# Patient Record
Sex: MALE | Race: BLACK OR AFRICAN AMERICAN | URBAN - METROPOLITAN AREA
[De-identification: names, ages, dates, MRNs, and addresses within clinical notes are randomized per-mention and may not be internally consistent; named-entity substitution may affect disease eponyms.]

---

## 2017-01-10 ENCOUNTER — HISTORICAL (OUTPATIENT)
Dept: HEMATOLOGY/ONCOLOGY | Facility: CLINIC | Age: 77
End: 2017-01-10

## 2017-01-11 ENCOUNTER — HISTORICAL (OUTPATIENT)
Dept: FAMILY MEDICINE | Facility: CLINIC | Age: 77
End: 2017-01-11

## 2017-01-11 ENCOUNTER — HISTORICAL (OUTPATIENT)
Dept: RADIOLOGY | Facility: HOSPITAL | Age: 77
End: 2017-01-11

## 2017-01-23 ENCOUNTER — HISTORICAL (OUTPATIENT)
Dept: INFUSION THERAPY | Facility: HOSPITAL | Age: 77
End: 2017-01-23

## 2017-01-27 ENCOUNTER — HISTORICAL (OUTPATIENT)
Dept: INFUSION THERAPY | Facility: HOSPITAL | Age: 77
End: 2017-01-27

## 2017-01-30 ENCOUNTER — HISTORICAL (OUTPATIENT)
Dept: INTERNAL MEDICINE | Facility: CLINIC | Age: 77
End: 2017-01-30

## 2017-02-20 ENCOUNTER — HISTORICAL (OUTPATIENT)
Dept: INFUSION THERAPY | Facility: HOSPITAL | Age: 77
End: 2017-02-20

## 2017-02-24 ENCOUNTER — HISTORICAL (OUTPATIENT)
Dept: INFUSION THERAPY | Facility: HOSPITAL | Age: 77
End: 2017-02-24

## 2017-02-27 ENCOUNTER — HISTORICAL (OUTPATIENT)
Dept: HEMATOLOGY/ONCOLOGY | Facility: CLINIC | Age: 77
End: 2017-02-27

## 2017-04-03 ENCOUNTER — HISTORICAL (OUTPATIENT)
Dept: ADMINISTRATIVE | Facility: HOSPITAL | Age: 77
End: 2017-04-03

## 2017-04-10 ENCOUNTER — HISTORICAL (OUTPATIENT)
Dept: SURGERY | Facility: HOSPITAL | Age: 77
End: 2017-04-10

## 2017-04-13 ENCOUNTER — HISTORICAL (OUTPATIENT)
Dept: FAMILY MEDICINE | Facility: CLINIC | Age: 77
End: 2017-04-13

## 2017-04-28 ENCOUNTER — HISTORICAL (OUTPATIENT)
Dept: ADMINISTRATIVE | Facility: HOSPITAL | Age: 77
End: 2017-04-28

## 2017-04-28 LAB — PSA SERPL-MCNC: 1.2 NG/ML (ref 0–4)

## 2017-05-02 ENCOUNTER — HISTORICAL (OUTPATIENT)
Dept: ADMINISTRATIVE | Facility: HOSPITAL | Age: 77
End: 2017-05-02

## 2017-05-02 LAB
ABS NEUT (OLG): 2.41 X10(3)/MCL (ref 2.1–9.2)
ALBUMIN SERPL-MCNC: 3.6 GM/DL (ref 3.4–5)
ALBUMIN/GLOB SERPL: 1 RATIO (ref 1–2)
ALP SERPL-CCNC: 66 UNIT/L (ref 20–120)
ALT SERPL-CCNC: 12 UNIT/L
AST SERPL-CCNC: 18 UNIT/L
BASOPHILS # BLD AUTO: 0.01 X10(3)/MCL
BASOPHILS NFR BLD AUTO: 0 % (ref 0–1)
BILIRUB SERPL-MCNC: 0.6 MG/DL
BILIRUBIN DIRECT+TOT PNL SERPL-MCNC: <0.1 MG/DL
BILIRUBIN DIRECT+TOT PNL SERPL-MCNC: >0.5 MG/DL
BUN SERPL-MCNC: 19 MG/DL (ref 7–25)
CALCIUM SERPL-MCNC: 9.1 MG/DL (ref 8.4–10.3)
CHLORIDE SERPL-SCNC: 109 MMOL/L (ref 96–110)
CO2 SERPL-SCNC: 24 MMOL/L (ref 24–32)
CREAT SERPL-MCNC: 1 MG/DL (ref 0.7–1.4)
EOSINOPHIL # BLD AUTO: 0.23 10*3/UL
EOSINOPHIL NFR BLD AUTO: 6 % (ref 0–5)
ERYTHROCYTE [DISTWIDTH] IN BLOOD BY AUTOMATED COUNT: 14.5 % (ref 11.5–14.5)
GLOBULIN SER-MCNC: 2.8 GM/ML (ref 2.3–3.5)
GLUCOSE SERPL-MCNC: 95 MG/DL (ref 65–99)
HCT VFR BLD AUTO: 31.6 % (ref 40–51)
HGB BLD-MCNC: 10.2 GM/DL (ref 13.5–17.5)
IMM GRANULOCYTES # BLD AUTO: 0.01 10*3/UL
IMM GRANULOCYTES NFR BLD AUTO: 0 %
LYMPHOCYTES # BLD AUTO: 0.77 X10(3)/MCL
LYMPHOCYTES NFR BLD AUTO: 20 % (ref 15–40)
MCH RBC QN AUTO: 28.5 PG (ref 26–34)
MCHC RBC AUTO-ENTMCNC: 32.3 GM/DL (ref 31–37)
MCV RBC AUTO: 88.3 FL (ref 80–100)
MONOCYTES # BLD AUTO: 0.5 X10(3)/MCL
MONOCYTES NFR BLD AUTO: 13 % (ref 4–12)
NEUTROPHILS # BLD AUTO: 2.41 X10(3)/MCL
NEUTROPHILS NFR BLD AUTO: 61 X10(3)/MCL
PLATELET # BLD AUTO: 162 X10(3)/MCL (ref 130–400)
PMV BLD AUTO: 9.3 FL (ref 7.4–10.4)
POTASSIUM SERPL-SCNC: 4.3 MMOL/L (ref 3.6–5.2)
PROT SERPL-MCNC: 6.4 GM/DL (ref 6–8)
RBC # BLD AUTO: 3.58 X10(6)/MCL (ref 4.5–5.9)
SODIUM SERPL-SCNC: 142 MMOL/L (ref 135–146)
WBC # SPEC AUTO: 3.9 X10(3)/MCL (ref 4.5–11)

## 2017-05-05 ENCOUNTER — HISTORICAL (OUTPATIENT)
Dept: SURGERY | Facility: HOSPITAL | Age: 77
End: 2017-05-05

## 2017-08-03 ENCOUNTER — HISTORICAL (OUTPATIENT)
Dept: ADMINISTRATIVE | Facility: HOSPITAL | Age: 77
End: 2017-08-03

## 2017-08-16 ENCOUNTER — HISTORICAL (OUTPATIENT)
Dept: ADMINISTRATIVE | Facility: HOSPITAL | Age: 77
End: 2017-08-16

## 2017-08-16 LAB
ABS NEUT (OLG): 4.92 X10(3)/MCL (ref 2.1–9.2)
ALBUMIN SERPL-MCNC: 3.4 GM/DL (ref 3.4–5)
ALBUMIN/GLOB SERPL: 1 RATIO (ref 1–2)
ALP SERPL-CCNC: 76 UNIT/L (ref 45–117)
ALT SERPL-CCNC: 19 UNIT/L (ref 12–78)
AST SERPL-CCNC: 14 UNIT/L (ref 15–37)
BASOPHILS # BLD AUTO: 0.02 X10(3)/MCL
BASOPHILS NFR BLD AUTO: 0 % (ref 0–1)
BILIRUB SERPL-MCNC: 0.4 MG/DL (ref 0.2–1)
BILIRUBIN DIRECT+TOT PNL SERPL-MCNC: <0.1 MG/DL
BILIRUBIN DIRECT+TOT PNL SERPL-MCNC: >0.3 MG/DL
BUN SERPL-MCNC: 20 MG/DL (ref 7–18)
CALCIUM SERPL-MCNC: 8.9 MG/DL (ref 8.5–10.1)
CHLORIDE SERPL-SCNC: 110 MMOL/L (ref 98–107)
CO2 SERPL-SCNC: 25 MMOL/L (ref 21–32)
CREAT SERPL-MCNC: 1.2 MG/DL (ref 0.6–1.3)
EOSINOPHIL # BLD AUTO: 0.12 10*3/UL
EOSINOPHIL NFR BLD AUTO: 2 % (ref 0–5)
ERYTHROCYTE [DISTWIDTH] IN BLOOD BY AUTOMATED COUNT: 13.2 % (ref 11.5–14.5)
GLOBULIN SER-MCNC: 3.5 GM/ML (ref 2.3–3.5)
GLUCOSE SERPL-MCNC: 121 MG/DL (ref 74–106)
HCT VFR BLD AUTO: 35.6 % (ref 40–51)
HGB BLD-MCNC: 11.4 GM/DL (ref 13.5–17.5)
IMM GRANULOCYTES # BLD AUTO: 0.02 10*3/UL
IMM GRANULOCYTES NFR BLD AUTO: 0 %
LYMPHOCYTES # BLD AUTO: 0.94 X10(3)/MCL
LYMPHOCYTES NFR BLD AUTO: 15 % (ref 15–40)
MCH RBC QN AUTO: 27.7 PG (ref 26–34)
MCHC RBC AUTO-ENTMCNC: 32 GM/DL (ref 31–37)
MCV RBC AUTO: 86.4 FL (ref 80–100)
MONOCYTES # BLD AUTO: 0.43 X10(3)/MCL
MONOCYTES NFR BLD AUTO: 7 % (ref 4–12)
NEUTROPHILS # BLD AUTO: 4.92 X10(3)/MCL
NEUTROPHILS NFR BLD AUTO: 76 X10(3)/MCL
PLATELET # BLD AUTO: 181 X10(3)/MCL (ref 130–400)
PMV BLD AUTO: 9.6 FL (ref 7.4–10.4)
POTASSIUM SERPL-SCNC: 4.5 MMOL/L (ref 3.5–5.1)
PROT SERPL-MCNC: 6.9 GM/DL (ref 6.4–8.2)
RBC # BLD AUTO: 4.12 X10(6)/MCL (ref 4.5–5.9)
SODIUM SERPL-SCNC: 142 MMOL/L (ref 136–145)
WBC # SPEC AUTO: 6.4 X10(3)/MCL (ref 4.5–11)

## 2017-10-09 ENCOUNTER — HISTORICAL (OUTPATIENT)
Dept: RADIOLOGY | Facility: HOSPITAL | Age: 77
End: 2017-10-09

## 2017-10-12 ENCOUNTER — HISTORICAL (OUTPATIENT)
Dept: ADMINISTRATIVE | Facility: HOSPITAL | Age: 77
End: 2017-10-12

## 2017-11-16 ENCOUNTER — HISTORICAL (OUTPATIENT)
Dept: ADMINISTRATIVE | Facility: HOSPITAL | Age: 77
End: 2017-11-16

## 2017-11-16 LAB
ABS NEUT (OLG): 3.87 X10(3)/MCL (ref 2.1–9.2)
ALBUMIN SERPL-MCNC: 3.2 GM/DL (ref 3.4–5)
ALBUMIN/GLOB SERPL: 1 RATIO (ref 1–2)
ALP SERPL-CCNC: 74 UNIT/L (ref 45–117)
ALT SERPL-CCNC: 14 UNIT/L (ref 12–78)
AST SERPL-CCNC: 11 UNIT/L (ref 15–37)
BASOPHILS # BLD AUTO: 0.01 X10(3)/MCL
BASOPHILS NFR BLD AUTO: 0 % (ref 0–1)
BILIRUB SERPL-MCNC: 0.4 MG/DL (ref 0.2–1)
BILIRUBIN DIRECT+TOT PNL SERPL-MCNC: 0.1 MG/DL
BILIRUBIN DIRECT+TOT PNL SERPL-MCNC: 0.3 MG/DL
BUN SERPL-MCNC: 12 MG/DL (ref 7–18)
CALCIUM SERPL-MCNC: 9 MG/DL (ref 8.5–10.1)
CHLORIDE SERPL-SCNC: 111 MMOL/L (ref 98–107)
CO2 SERPL-SCNC: 28 MMOL/L (ref 21–32)
CREAT SERPL-MCNC: 1 MG/DL (ref 0.6–1.3)
EOSINOPHIL # BLD AUTO: 0.29 10*3/UL
EOSINOPHIL NFR BLD AUTO: 5 % (ref 0–5)
ERYTHROCYTE [DISTWIDTH] IN BLOOD BY AUTOMATED COUNT: 13.3 % (ref 11.5–14.5)
GLOBULIN SER-MCNC: 3.5 GM/ML (ref 2.3–3.5)
GLUCOSE SERPL-MCNC: 90 MG/DL (ref 74–106)
HCT VFR BLD AUTO: 33.9 % (ref 40–51)
HGB BLD-MCNC: 10.8 GM/DL (ref 13.5–17.5)
IMM GRANULOCYTES # BLD AUTO: 0.02 10*3/UL
IMM GRANULOCYTES NFR BLD AUTO: 0 %
LYMPHOCYTES # BLD AUTO: 0.73 X10(3)/MCL
LYMPHOCYTES NFR BLD AUTO: 14 % (ref 15–40)
MCH RBC QN AUTO: 27.6 PG (ref 26–34)
MCHC RBC AUTO-ENTMCNC: 31.9 GM/DL (ref 31–37)
MCV RBC AUTO: 86.7 FL (ref 80–100)
MONOCYTES # BLD AUTO: 0.46 X10(3)/MCL
MONOCYTES NFR BLD AUTO: 9 % (ref 4–12)
NEUTROPHILS # BLD AUTO: 3.87 X10(3)/MCL
NEUTROPHILS NFR BLD AUTO: 72 X10(3)/MCL
PLATELET # BLD AUTO: 196 X10(3)/MCL (ref 130–400)
PMV BLD AUTO: 9.2 FL (ref 7.4–10.4)
POTASSIUM SERPL-SCNC: 3.6 MMOL/L (ref 3.5–5.1)
PROT SERPL-MCNC: 6.7 GM/DL (ref 6.4–8.2)
RBC # BLD AUTO: 3.91 X10(6)/MCL (ref 4.5–5.9)
SODIUM SERPL-SCNC: 144 MMOL/L (ref 136–145)
WBC # SPEC AUTO: 5.4 X10(3)/MCL (ref 4.5–11)

## 2017-12-27 ENCOUNTER — HISTORICAL (OUTPATIENT)
Dept: ADMINISTRATIVE | Facility: HOSPITAL | Age: 77
End: 2017-12-27

## 2017-12-27 LAB
BUN SERPL-MCNC: 18 MG/DL (ref 7–18)
CALCIUM SERPL-MCNC: 8.9 MG/DL (ref 8.5–10.1)
CHLORIDE SERPL-SCNC: 111 MMOL/L (ref 98–107)
CO2 SERPL-SCNC: 29 MMOL/L (ref 21–32)
CREAT SERPL-MCNC: 1.1 MG/DL (ref 0.6–1.3)
ERYTHROCYTE [DISTWIDTH] IN BLOOD BY AUTOMATED COUNT: 13.8 % (ref 11.5–14.5)
GLUCOSE SERPL-MCNC: 81 MG/DL (ref 74–106)
HCT VFR BLD AUTO: 37.3 % (ref 40–51)
HGB BLD-MCNC: 11.6 GM/DL (ref 13.5–17.5)
MCH RBC QN AUTO: 27.1 PG (ref 26–34)
MCHC RBC AUTO-ENTMCNC: 31.1 GM/DL (ref 31–37)
MCV RBC AUTO: 87.1 FL (ref 80–100)
PLATELET # BLD AUTO: 183 X10(3)/MCL (ref 130–400)
PMV BLD AUTO: 10.4 FL (ref 7.4–10.4)
POTASSIUM SERPL-SCNC: 4.3 MMOL/L (ref 3.5–5.1)
RBC # BLD AUTO: 4.28 X10(6)/MCL (ref 4.5–5.9)
SODIUM SERPL-SCNC: 145 MMOL/L (ref 136–145)
WBC # SPEC AUTO: 4.5 X10(3)/MCL (ref 4.5–11)

## 2017-12-28 ENCOUNTER — HISTORICAL (OUTPATIENT)
Dept: ADMINISTRATIVE | Facility: HOSPITAL | Age: 77
End: 2017-12-28

## 2017-12-28 LAB
ABS NEUT (OLG): 2.41 X10(3)/MCL (ref 2.1–9.2)
BASOPHILS # BLD AUTO: 0.02 X10(3)/MCL
BASOPHILS NFR BLD AUTO: 0 % (ref 0–1)
BUN SERPL-MCNC: 18 MG/DL (ref 7–18)
CALCIUM SERPL-MCNC: 8.8 MG/DL (ref 8.5–10.1)
CHLORIDE SERPL-SCNC: 110 MMOL/L (ref 98–107)
CO2 SERPL-SCNC: 29 MMOL/L (ref 21–32)
CREAT SERPL-MCNC: 1.1 MG/DL (ref 0.6–1.3)
EOSINOPHIL # BLD AUTO: 0.12 10*3/UL
EOSINOPHIL NFR BLD AUTO: 3 % (ref 0–5)
ERYTHROCYTE [DISTWIDTH] IN BLOOD BY AUTOMATED COUNT: 13.8 % (ref 11.5–14.5)
FOLATE SERPL-MCNC: 12.2 NG/ML (ref 3.1–17.5)
GLUCOSE SERPL-MCNC: 107 MG/DL (ref 74–106)
HCT VFR BLD AUTO: 37.5 % (ref 40–51)
HGB BLD-MCNC: 11.7 GM/DL (ref 13.5–17.5)
IMM GRANULOCYTES # BLD AUTO: 0.01 10*3/UL
IMM GRANULOCYTES NFR BLD AUTO: 0 %
LYMPHOCYTES # BLD AUTO: 0.93 X10(3)/MCL
LYMPHOCYTES NFR BLD AUTO: 24 % (ref 15–40)
MCH RBC QN AUTO: 27.3 PG (ref 26–34)
MCHC RBC AUTO-ENTMCNC: 31.2 GM/DL (ref 31–37)
MCV RBC AUTO: 87.4 FL (ref 80–100)
MONOCYTES # BLD AUTO: 0.33 X10(3)/MCL
MONOCYTES NFR BLD AUTO: 9 % (ref 4–12)
NEUTROPHILS # BLD AUTO: 2.41 X10(3)/MCL
NEUTROPHILS NFR BLD AUTO: 63 X10(3)/MCL
PLATELET # BLD AUTO: 184 X10(3)/MCL (ref 130–400)
PMV BLD AUTO: 10.7 FL (ref 7.4–10.4)
POTASSIUM SERPL-SCNC: 4.2 MMOL/L (ref 3.5–5.1)
RBC # BLD AUTO: 4.29 X10(6)/MCL (ref 4.5–5.9)
SODIUM SERPL-SCNC: 143 MMOL/L (ref 136–145)
T PALLIDUM AB SER QL: NONREACTIVE
TSH SERPL-ACNC: 2.25 MIU/L (ref 0.36–3.74)
VIT B12 SERPL-MCNC: 520 PG/ML (ref 193–986)
WBC # SPEC AUTO: 3.8 X10(3)/MCL (ref 4.5–11)

## 2018-01-03 ENCOUNTER — HISTORICAL (OUTPATIENT)
Dept: SURGERY | Facility: HOSPITAL | Age: 78
End: 2018-01-03

## 2018-01-15 ENCOUNTER — HISTORICAL (OUTPATIENT)
Dept: RADIOLOGY | Facility: HOSPITAL | Age: 78
End: 2018-01-15

## 2018-03-06 ENCOUNTER — HISTORICAL (OUTPATIENT)
Dept: ADMINISTRATIVE | Facility: HOSPITAL | Age: 78
End: 2018-03-06

## 2018-03-06 LAB
ABS NEUT (OLG): 2.11 X10(3)/MCL (ref 2.1–9.2)
ALBUMIN SERPL-MCNC: 3.8 GM/DL (ref 3.4–5)
ALBUMIN/GLOB SERPL: 1 RATIO (ref 1–2)
ALP SERPL-CCNC: 87 UNIT/L (ref 45–117)
ALT SERPL-CCNC: 14 UNIT/L (ref 12–78)
AST SERPL-CCNC: 17 UNIT/L (ref 15–37)
BASOPHILS # BLD AUTO: 0.02 X10(3)/MCL
BASOPHILS NFR BLD AUTO: 0 %
BILIRUB SERPL-MCNC: 0.6 MG/DL (ref 0.2–1)
BILIRUBIN DIRECT+TOT PNL SERPL-MCNC: 0.2 MG/DL
BILIRUBIN DIRECT+TOT PNL SERPL-MCNC: 0.4 MG/DL
BUN SERPL-MCNC: 17 MG/DL (ref 7–18)
CALCIUM SERPL-MCNC: 9.5 MG/DL (ref 8.5–10.1)
CHLORIDE SERPL-SCNC: 112 MMOL/L (ref 98–107)
CHOLEST SERPL-MCNC: 141 MG/DL
CHOLEST/HDLC SERPL: 2.8 {RATIO} (ref 0–5)
CO2 SERPL-SCNC: 28 MMOL/L (ref 21–32)
CREAT SERPL-MCNC: 1 MG/DL (ref 0.6–1.3)
EOSINOPHIL # BLD AUTO: 0.12 X10(3)/MCL
EOSINOPHIL NFR BLD AUTO: 3 %
ERYTHROCYTE [DISTWIDTH] IN BLOOD BY AUTOMATED COUNT: 14.5 % (ref 11.5–14.5)
FERRITIN SERPL-MCNC: 257.8 NG/ML (ref 26–388)
GLOBULIN SER-MCNC: 4.1 GM/ML (ref 2.3–3.5)
GLUCOSE SERPL-MCNC: 100 MG/DL (ref 74–106)
HCT VFR BLD AUTO: 40.3 % (ref 40–51)
HDLC SERPL-MCNC: 50 MG/DL
HGB BLD-MCNC: 12.7 GM/DL (ref 13.5–17.5)
IMM GRANULOCYTES # BLD AUTO: 0.01 10*3/UL
IMM GRANULOCYTES NFR BLD AUTO: 0 %
IRON SATN MFR SERPL: 44 % (ref 15–50)
IRON SERPL-MCNC: 77 MCG/DL (ref 65–175)
LDLC SERPL CALC-MCNC: 81 MG/DL (ref 0–130)
LYMPHOCYTES # BLD AUTO: 1.23 X10(3)/MCL
LYMPHOCYTES NFR BLD AUTO: 32 % (ref 13–40)
MCH RBC QN AUTO: 27.4 PG (ref 26–34)
MCHC RBC AUTO-ENTMCNC: 31.5 GM/DL (ref 31–37)
MCV RBC AUTO: 87 FL (ref 80–100)
MONOCYTES # BLD AUTO: 0.38 X10(3)/MCL
MONOCYTES NFR BLD AUTO: 10 % (ref 4–12)
NEUTROPHILS # BLD AUTO: 2.11 X10(3)/MCL
NEUTROPHILS NFR BLD AUTO: 54 X10(3)/MCL
PLATELET # BLD AUTO: 224 X10(3)/MCL (ref 130–400)
PMV BLD AUTO: 10.3 FL (ref 7.4–10.4)
POTASSIUM SERPL-SCNC: 5 MMOL/L (ref 3.5–5.1)
PROT SERPL-MCNC: 7.9 GM/DL (ref 6.4–8.2)
RBC # BLD AUTO: 4.63 X10(6)/MCL (ref 4.5–5.9)
SODIUM SERPL-SCNC: 147 MMOL/L (ref 136–145)
TIBC SERPL-MCNC: 175 MCG/DL (ref 250–450)
TRANSFERRIN SERPL-MCNC: 163 MG/DL (ref 200–360)
TRIGL SERPL-MCNC: 52 MG/DL
VLDLC SERPL CALC-MCNC: 10 MG/DL
WBC # SPEC AUTO: 3.9 X10(3)/MCL (ref 4.5–11)

## 2018-03-14 ENCOUNTER — HISTORICAL (OUTPATIENT)
Dept: RADIOLOGY | Facility: HOSPITAL | Age: 78
End: 2018-03-14

## 2018-03-14 LAB
ABS NEUT (OLG): 2.32 X10(3)/MCL (ref 2.1–9.2)
ALBUMIN SERPL-MCNC: 3.4 GM/DL (ref 3.4–5)
ALBUMIN/GLOB SERPL: 1 RATIO (ref 1–2)
ALP SERPL-CCNC: 82 UNIT/L (ref 45–117)
ALT SERPL-CCNC: 11 UNIT/L (ref 12–78)
AST SERPL-CCNC: 13 UNIT/L (ref 15–37)
BASOPHILS # BLD AUTO: 0.01 X10(3)/MCL
BASOPHILS NFR BLD AUTO: 0 %
BILIRUB SERPL-MCNC: 0.8 MG/DL (ref 0.2–1)
BILIRUBIN DIRECT+TOT PNL SERPL-MCNC: 0.2 MG/DL
BILIRUBIN DIRECT+TOT PNL SERPL-MCNC: 0.6 MG/DL
BUN SERPL-MCNC: 15 MG/DL (ref 7–18)
CALCIUM SERPL-MCNC: 8.9 MG/DL (ref 8.5–10.1)
CHLORIDE SERPL-SCNC: 114 MMOL/L (ref 98–107)
CO2 SERPL-SCNC: 28 MMOL/L (ref 21–32)
CREAT SERPL-MCNC: 1 MG/DL (ref 0.6–1.3)
EOSINOPHIL # BLD AUTO: 0.15 X10(3)/MCL
EOSINOPHIL NFR BLD AUTO: 4 %
ERYTHROCYTE [DISTWIDTH] IN BLOOD BY AUTOMATED COUNT: 14.5 % (ref 11.5–14.5)
GLOBULIN SER-MCNC: 3.7 GM/ML (ref 2.3–3.5)
GLUCOSE SERPL-MCNC: 101 MG/DL (ref 74–106)
HCT VFR BLD AUTO: 36.6 % (ref 40–51)
HGB BLD-MCNC: 11.5 GM/DL (ref 13.5–17.5)
LYMPHOCYTES # BLD AUTO: 0.9 X10(3)/MCL
LYMPHOCYTES NFR BLD AUTO: 24 % (ref 13–40)
MCH RBC QN AUTO: 27.2 PG (ref 26–34)
MCHC RBC AUTO-ENTMCNC: 31.4 GM/DL (ref 31–37)
MCV RBC AUTO: 86.5 FL (ref 80–100)
MONOCYTES # BLD AUTO: 0.43 X10(3)/MCL
MONOCYTES NFR BLD AUTO: 11 % (ref 4–12)
NEUTROPHILS # BLD AUTO: 2.32 X10(3)/MCL
NEUTROPHILS NFR BLD AUTO: 61 X10(3)/MCL
PLATELET # BLD AUTO: 182 X10(3)/MCL (ref 130–400)
PMV BLD AUTO: 10.2 FL (ref 7.4–10.4)
POTASSIUM SERPL-SCNC: 4 MMOL/L (ref 3.5–5.1)
PROT SERPL-MCNC: 7.1 GM/DL (ref 6.4–8.2)
RBC # BLD AUTO: 4.23 X10(6)/MCL (ref 4.5–5.9)
SODIUM SERPL-SCNC: 143 MMOL/L (ref 136–145)
WBC # SPEC AUTO: 3.8 X10(3)/MCL (ref 4.5–11)

## 2018-03-26 ENCOUNTER — HISTORICAL (OUTPATIENT)
Dept: CARDIOLOGY | Facility: HOSPITAL | Age: 78
End: 2018-03-26

## 2018-06-07 ENCOUNTER — HISTORICAL (OUTPATIENT)
Dept: ADMINISTRATIVE | Facility: HOSPITAL | Age: 78
End: 2018-06-07

## 2018-06-07 LAB
ABS NEUT (OLG): 2.67 X10(3)/MCL (ref 2.1–9.2)
BASOPHILS # BLD AUTO: 0.01 X10(3)/MCL
BASOPHILS NFR BLD AUTO: 0 %
BUN SERPL-MCNC: 17 MG/DL (ref 7–18)
CALCIUM SERPL-MCNC: 9.5 MG/DL (ref 8.5–10.1)
CHLORIDE SERPL-SCNC: 109 MMOL/L (ref 98–107)
CO2 SERPL-SCNC: 27 MMOL/L (ref 21–32)
CREAT SERPL-MCNC: 1.1 MG/DL (ref 0.6–1.3)
CREAT/UREA NIT SERPL: 15
EOSINOPHIL # BLD AUTO: 0.13 X10(3)/MCL
EOSINOPHIL NFR BLD AUTO: 3 %
ERYTHROCYTE [DISTWIDTH] IN BLOOD BY AUTOMATED COUNT: 13.2 % (ref 11.5–14.5)
GLUCOSE SERPL-MCNC: 100 MG/DL (ref 74–106)
HCT VFR BLD AUTO: 39.6 % (ref 40–51)
HGB BLD-MCNC: 12.5 GM/DL (ref 13.5–17.5)
IMM GRANULOCYTES # BLD AUTO: 0.01 10*3/UL
IMM GRANULOCYTES NFR BLD AUTO: 0 %
LYMPHOCYTES # BLD AUTO: 1.37 X10(3)/MCL
LYMPHOCYTES NFR BLD AUTO: 30 % (ref 13–40)
MCH RBC QN AUTO: 27.6 PG (ref 26–34)
MCHC RBC AUTO-ENTMCNC: 31.6 GM/DL (ref 31–37)
MCV RBC AUTO: 87.4 FL (ref 80–100)
MONOCYTES # BLD AUTO: 0.45 X10(3)/MCL
MONOCYTES NFR BLD AUTO: 10 % (ref 4–12)
NEUTROPHILS # BLD AUTO: 2.67 X10(3)/MCL
NEUTROPHILS NFR BLD AUTO: 58 X10(3)/MCL
PLATELET # BLD AUTO: 197 X10(3)/MCL (ref 130–400)
PMV BLD AUTO: 9.5 FL (ref 7.4–10.4)
POTASSIUM SERPL-SCNC: 4.6 MMOL/L (ref 3.5–5.1)
PSA SERPL-MCNC: 1.4 NG/ML
RBC # BLD AUTO: 4.53 X10(6)/MCL (ref 4.5–5.9)
SODIUM SERPL-SCNC: 144 MMOL/L (ref 136–145)
WBC # SPEC AUTO: 4.6 X10(3)/MCL (ref 4.5–11)

## 2018-06-21 ENCOUNTER — HISTORICAL (OUTPATIENT)
Dept: RADIOLOGY | Facility: HOSPITAL | Age: 78
End: 2018-06-21

## 2018-07-13 ENCOUNTER — HISTORICAL (OUTPATIENT)
Dept: ADMINISTRATIVE | Facility: HOSPITAL | Age: 78
End: 2018-07-13

## 2018-07-13 LAB
BUN SERPL-MCNC: 14 MG/DL (ref 7–18)
CALCIUM SERPL-MCNC: 9.1 MG/DL (ref 8.5–10.1)
CHLORIDE SERPL-SCNC: 111 MMOL/L (ref 98–107)
CO2 SERPL-SCNC: 30 MMOL/L (ref 21–32)
CREAT SERPL-MCNC: 1 MG/DL (ref 0.6–1.3)
CREAT/UREA NIT SERPL: 14
ERYTHROCYTE [DISTWIDTH] IN BLOOD BY AUTOMATED COUNT: 13.5 % (ref 11.5–14.5)
GLUCOSE SERPL-MCNC: 100 MG/DL (ref 74–106)
HCT VFR BLD AUTO: 37.7 % (ref 40–51)
HGB BLD-MCNC: 11.6 GM/DL (ref 13.5–17.5)
MCH RBC QN AUTO: 27.2 PG (ref 26–34)
MCHC RBC AUTO-ENTMCNC: 30.8 GM/DL (ref 31–37)
MCV RBC AUTO: 88.3 FL (ref 80–100)
PLATELET # BLD AUTO: 187 X10(3)/MCL (ref 130–400)
PMV BLD AUTO: 10.2 FL (ref 7.4–10.4)
POTASSIUM SERPL-SCNC: 4.4 MMOL/L (ref 3.5–5.1)
RBC # BLD AUTO: 4.27 X10(6)/MCL (ref 4.5–5.9)
SODIUM SERPL-SCNC: 146 MMOL/L (ref 136–145)
WBC # SPEC AUTO: 3.9 X10(3)/MCL (ref 4.5–11)

## 2018-07-24 ENCOUNTER — HISTORICAL (OUTPATIENT)
Dept: ADMINISTRATIVE | Facility: HOSPITAL | Age: 78
End: 2018-07-24

## 2018-07-24 LAB
ABS NEUT (OLG): 2.59 X10(3)/MCL (ref 2.1–9.2)
ALBUMIN SERPL-MCNC: 3.5 GM/DL (ref 3.4–5)
ALBUMIN/GLOB SERPL: 1 RATIO (ref 1–2)
ALP SERPL-CCNC: 78 UNIT/L (ref 45–117)
ALT SERPL-CCNC: 15 UNIT/L (ref 12–78)
AST SERPL-CCNC: 16 UNIT/L (ref 15–37)
BASOPHILS # BLD AUTO: 0.02 X10(3)/MCL
BASOPHILS NFR BLD AUTO: 0 %
BILIRUB SERPL-MCNC: 0.4 MG/DL (ref 0.2–1)
BILIRUBIN DIRECT+TOT PNL SERPL-MCNC: 0.1 MG/DL
BILIRUBIN DIRECT+TOT PNL SERPL-MCNC: 0.3 MG/DL
BUN SERPL-MCNC: 16 MG/DL (ref 7–18)
CALCIUM SERPL-MCNC: 8.6 MG/DL (ref 8.5–10.1)
CHLORIDE SERPL-SCNC: 112 MMOL/L (ref 98–107)
CO2 SERPL-SCNC: 24 MMOL/L (ref 21–32)
CREAT SERPL-MCNC: 1.1 MG/DL (ref 0.6–1.3)
EOSINOPHIL # BLD AUTO: 0.1 10*3/UL
EOSINOPHIL NFR BLD AUTO: 2 %
ERYTHROCYTE [DISTWIDTH] IN BLOOD BY AUTOMATED COUNT: 13.2 % (ref 11.5–14.5)
GLOBULIN SER-MCNC: 3.5 GM/ML (ref 2.3–3.5)
GLUCOSE SERPL-MCNC: 98 MG/DL (ref 74–106)
HCT VFR BLD AUTO: 34.5 % (ref 40–51)
HGB BLD-MCNC: 11.1 GM/DL (ref 13.5–17.5)
IMM GRANULOCYTES # BLD AUTO: 0.01 10*3/UL
IMM GRANULOCYTES NFR BLD AUTO: 0 %
LYMPHOCYTES # BLD AUTO: 0.98 X10(3)/MCL
LYMPHOCYTES NFR BLD AUTO: 24 % (ref 13–40)
MCH RBC QN AUTO: 27.8 PG (ref 26–34)
MCHC RBC AUTO-ENTMCNC: 32.2 GM/DL (ref 31–37)
MCV RBC AUTO: 86.3 FL (ref 80–100)
MONOCYTES # BLD AUTO: 0.35 X10(3)/MCL
MONOCYTES NFR BLD AUTO: 9 % (ref 4–12)
NEUTROPHILS # BLD AUTO: 2.59 X10(3)/MCL
NEUTROPHILS NFR BLD AUTO: 64 X10(3)/MCL
PLATELET # BLD AUTO: 166 X10(3)/MCL (ref 130–400)
PMV BLD AUTO: 9.6 FL (ref 7.4–10.4)
POTASSIUM SERPL-SCNC: 3.6 MMOL/L (ref 3.5–5.1)
PROT SERPL-MCNC: 7 GM/DL (ref 6.4–8.2)
RBC # BLD AUTO: 4 X10(6)/MCL (ref 4.5–5.9)
SODIUM SERPL-SCNC: 145 MMOL/L (ref 136–145)
WBC # SPEC AUTO: 4 X10(3)/MCL (ref 4.5–11)

## 2018-07-25 ENCOUNTER — HISTORICAL (OUTPATIENT)
Dept: SURGERY | Facility: HOSPITAL | Age: 78
End: 2018-07-25

## 2018-09-06 ENCOUNTER — HISTORICAL (OUTPATIENT)
Dept: ADMINISTRATIVE | Facility: HOSPITAL | Age: 78
End: 2018-09-06

## 2018-09-06 LAB
APPEARANCE, UA: CLEAR
BACTERIA #/AREA URNS AUTO: ABNORMAL /[HPF]
BILIRUB UR QL STRIP: NEGATIVE
COLOR UR: YELLOW
GLUCOSE (UA): NORMAL
HGB UR QL STRIP: NEGATIVE
HYALINE CASTS #/AREA URNS LPF: ABNORMAL /[LPF]
KETONES UR QL STRIP: NEGATIVE
LEUKOCYTE ESTERASE UR QL STRIP: NEGATIVE
NITRITE UR QL STRIP: NEGATIVE
PH UR STRIP: 6 [PH] (ref 4.5–8)
PROT UR QL STRIP: NEGATIVE
RBC #/AREA URNS AUTO: ABNORMAL /[HPF]
SP GR UR STRIP: 1.01 (ref 1–1.03)
SQUAMOUS #/AREA URNS LPF: ABNORMAL /[LPF]
UROBILINOGEN UR STRIP-ACNC: NORMAL
WBC #/AREA URNS AUTO: ABNORMAL /HPF

## 2018-10-23 ENCOUNTER — HISTORICAL (OUTPATIENT)
Dept: ADMINISTRATIVE | Facility: HOSPITAL | Age: 78
End: 2018-10-23

## 2018-10-23 LAB
ABS NEUT (OLG): 3.23 X10(3)/MCL (ref 2.1–9.2)
ALBUMIN SERPL-MCNC: 3.6 GM/DL (ref 3.4–5)
ALBUMIN/GLOB SERPL: 1 RATIO (ref 1–2)
ALP SERPL-CCNC: 87 UNIT/L (ref 45–117)
ALT SERPL-CCNC: 17 UNIT/L (ref 12–78)
AST SERPL-CCNC: 18 UNIT/L (ref 15–37)
BASOPHILS # BLD AUTO: 0.03 X10(3)/MCL
BASOPHILS NFR BLD AUTO: 1 %
BILIRUB SERPL-MCNC: 0.3 MG/DL (ref 0.2–1)
BILIRUBIN DIRECT+TOT PNL SERPL-MCNC: 0.1 MG/DL
BILIRUBIN DIRECT+TOT PNL SERPL-MCNC: 0.2 MG/DL
BUN SERPL-MCNC: 17 MG/DL (ref 7–18)
CALCIUM SERPL-MCNC: 8.8 MG/DL (ref 8.5–10.1)
CHLORIDE SERPL-SCNC: 110 MMOL/L (ref 98–107)
CO2 SERPL-SCNC: 27 MMOL/L (ref 21–32)
CREAT SERPL-MCNC: 1.2 MG/DL (ref 0.6–1.3)
EOSINOPHIL # BLD AUTO: 0.12 X10(3)/MCL
EOSINOPHIL NFR BLD AUTO: 2 %
ERYTHROCYTE [DISTWIDTH] IN BLOOD BY AUTOMATED COUNT: 13.7 % (ref 11.5–14.5)
GLOBULIN SER-MCNC: 3.8 GM/ML (ref 2.3–3.5)
GLUCOSE SERPL-MCNC: 105 MG/DL (ref 74–106)
HCT VFR BLD AUTO: 36.3 % (ref 40–51)
HGB BLD-MCNC: 11.5 GM/DL (ref 13.5–17.5)
IMM GRANULOCYTES # BLD AUTO: 0.01 10*3/UL
IMM GRANULOCYTES NFR BLD AUTO: 0 %
LYMPHOCYTES # BLD AUTO: 1.14 X10(3)/MCL
LYMPHOCYTES NFR BLD AUTO: 23 % (ref 13–40)
MCH RBC QN AUTO: 27.5 PG (ref 26–34)
MCHC RBC AUTO-ENTMCNC: 31.7 GM/DL (ref 31–37)
MCV RBC AUTO: 86.8 FL (ref 80–100)
MONOCYTES # BLD AUTO: 0.48 X10(3)/MCL
MONOCYTES NFR BLD AUTO: 10 % (ref 4–12)
NEUTROPHILS # BLD AUTO: 3.23 X10(3)/MCL
NEUTROPHILS NFR BLD AUTO: 64 X10(3)/MCL
PLATELET # BLD AUTO: 180 X10(3)/MCL (ref 130–400)
PMV BLD AUTO: 9.6 FL (ref 7.4–10.4)
POTASSIUM SERPL-SCNC: 3.8 MMOL/L (ref 3.5–5.1)
PROT SERPL-MCNC: 7.4 GM/DL (ref 6.4–8.2)
RBC # BLD AUTO: 4.18 X10(6)/MCL (ref 4.5–5.9)
SODIUM SERPL-SCNC: 145 MMOL/L (ref 136–145)
WBC # SPEC AUTO: 5 X10(3)/MCL (ref 4.5–11)

## 2019-01-23 ENCOUNTER — HISTORICAL (OUTPATIENT)
Dept: ADMINISTRATIVE | Facility: HOSPITAL | Age: 79
End: 2019-01-23

## 2019-01-23 LAB
ABS NEUT (OLG): 2.5 X10(3)/MCL (ref 2.1–9.2)
ALBUMIN SERPL-MCNC: 3.4 GM/DL (ref 3.4–5)
ALBUMIN/GLOB SERPL: 0.94 RATIO (ref 1.1–2)
ALP SERPL-CCNC: 76 UNIT/L (ref 45–117)
ALT SERPL-CCNC: 19 UNIT/L (ref 12–78)
AST SERPL-CCNC: 15 UNIT/L (ref 15–37)
BASOPHILS # BLD AUTO: 0.03 X10(3)/MCL
BASOPHILS NFR BLD AUTO: 1 %
BILIRUB SERPL-MCNC: 0.5 MG/DL (ref 0.2–1)
BILIRUBIN DIRECT+TOT PNL SERPL-MCNC: 0.1 MG/DL
BILIRUBIN DIRECT+TOT PNL SERPL-MCNC: 0.4 MG/DL
BUN SERPL-MCNC: 20 MG/DL (ref 7–18)
CALCIUM SERPL-MCNC: 8.8 MG/DL (ref 8.5–10.1)
CHLORIDE SERPL-SCNC: 111 MMOL/L (ref 98–107)
CO2 SERPL-SCNC: 28 MMOL/L (ref 21–32)
CREAT SERPL-MCNC: 1.2 MG/DL (ref 0.6–1.3)
EOSINOPHIL # BLD AUTO: 0.15 X10(3)/MCL
EOSINOPHIL NFR BLD AUTO: 4 %
ERYTHROCYTE [DISTWIDTH] IN BLOOD BY AUTOMATED COUNT: 13.4 % (ref 11.5–14.5)
GLOBULIN SER-MCNC: 3.6 GM/ML (ref 2.3–3.5)
GLUCOSE SERPL-MCNC: 82 MG/DL (ref 74–106)
HCT VFR BLD AUTO: 37.2 % (ref 40–51)
HGB BLD-MCNC: 11.7 GM/DL (ref 13.5–17.5)
IMM GRANULOCYTES # BLD AUTO: 0.01 10*3/UL
IMM GRANULOCYTES NFR BLD AUTO: 0 %
LYMPHOCYTES # BLD AUTO: 1.16 X10(3)/MCL
LYMPHOCYTES NFR BLD AUTO: 27 % (ref 13–40)
MCH RBC QN AUTO: 27.5 PG (ref 26–34)
MCHC RBC AUTO-ENTMCNC: 31.5 GM/DL (ref 31–37)
MCV RBC AUTO: 87.3 FL (ref 80–100)
MONOCYTES # BLD AUTO: 0.46 X10(3)/MCL
MONOCYTES NFR BLD AUTO: 11 % (ref 4–12)
NEUTROPHILS # BLD AUTO: 2.5 X10(3)/MCL
NEUTROPHILS NFR BLD AUTO: 58 X10(3)/MCL
PLATELET # BLD AUTO: 188 X10(3)/MCL (ref 130–400)
PMV BLD AUTO: 9.7 FL (ref 7.4–10.4)
POTASSIUM SERPL-SCNC: 3.6 MMOL/L (ref 3.5–5.1)
PROT SERPL-MCNC: 7 GM/DL (ref 6.4–8.2)
RBC # BLD AUTO: 4.26 X10(6)/MCL (ref 4.5–5.9)
SODIUM SERPL-SCNC: 144 MMOL/L (ref 136–145)
WBC # SPEC AUTO: 4.3 X10(3)/MCL (ref 4.5–11)

## 2019-01-30 ENCOUNTER — HISTORICAL (OUTPATIENT)
Dept: ADMINISTRATIVE | Facility: HOSPITAL | Age: 79
End: 2019-01-30

## 2019-01-31 ENCOUNTER — HISTORICAL (OUTPATIENT)
Dept: SURGERY | Facility: HOSPITAL | Age: 79
End: 2019-01-31

## 2019-02-04 ENCOUNTER — HISTORICAL (OUTPATIENT)
Dept: RADIOLOGY | Facility: HOSPITAL | Age: 79
End: 2019-02-04

## 2019-05-09 ENCOUNTER — HISTORICAL (OUTPATIENT)
Dept: ADMINISTRATIVE | Facility: HOSPITAL | Age: 79
End: 2019-05-09

## 2019-05-09 LAB
ABS NEUT (OLG): 3.02 X10(3)/MCL (ref 2.1–9.2)
ALBUMIN SERPL-MCNC: 3.3 GM/DL (ref 3.4–5)
ALBUMIN/GLOB SERPL: 0.9 RATIO (ref 1.1–2)
ALP SERPL-CCNC: 92 UNIT/L (ref 45–117)
ALT SERPL-CCNC: 20 UNIT/L (ref 12–78)
AST SERPL-CCNC: 17 UNIT/L (ref 15–37)
BASOPHILS # BLD AUTO: 0.02 X10(3)/MCL
BASOPHILS NFR BLD AUTO: 0 %
BILIRUB SERPL-MCNC: 0.4 MG/DL (ref 0.2–1)
BILIRUBIN DIRECT+TOT PNL SERPL-MCNC: 0.1 MG/DL
BILIRUBIN DIRECT+TOT PNL SERPL-MCNC: 0.3 MG/DL
BUN SERPL-MCNC: 19 MG/DL (ref 7–18)
CALCIUM SERPL-MCNC: 8.9 MG/DL (ref 8.5–10.1)
CHLORIDE SERPL-SCNC: 112 MMOL/L (ref 98–107)
CO2 SERPL-SCNC: 28 MMOL/L (ref 21–32)
CREAT SERPL-MCNC: 1.1 MG/DL (ref 0.6–1.3)
EOSINOPHIL # BLD AUTO: 0.22 X10(3)/MCL
EOSINOPHIL NFR BLD AUTO: 5 %
ERYTHROCYTE [DISTWIDTH] IN BLOOD BY AUTOMATED COUNT: 13.2 % (ref 11.5–14.5)
GLOBULIN SER-MCNC: 3.6 GM/ML (ref 2.3–3.5)
GLUCOSE SERPL-MCNC: 100 MG/DL (ref 74–106)
HCT VFR BLD AUTO: 36.1 % (ref 40–51)
HGB BLD-MCNC: 11.6 GM/DL (ref 13.5–17.5)
IMM GRANULOCYTES # BLD AUTO: 0.01 10*3/UL
IMM GRANULOCYTES NFR BLD AUTO: 0 %
LYMPHOCYTES # BLD AUTO: 1.04 X10(3)/MCL
LYMPHOCYTES NFR BLD AUTO: 22 % (ref 13–40)
MCH RBC QN AUTO: 27.9 PG (ref 26–34)
MCHC RBC AUTO-ENTMCNC: 32.1 GM/DL (ref 31–37)
MCV RBC AUTO: 86.8 FL (ref 80–100)
MONOCYTES # BLD AUTO: 0.48 X10(3)/MCL
MONOCYTES NFR BLD AUTO: 10 % (ref 4–12)
NEUTROPHILS # BLD AUTO: 3.02 X10(3)/MCL
NEUTROPHILS NFR BLD AUTO: 63 X10(3)/MCL
PLATELET # BLD AUTO: 183 X10(3)/MCL (ref 130–400)
PMV BLD AUTO: 10 FL (ref 7.4–10.4)
POTASSIUM SERPL-SCNC: 4.2 MMOL/L (ref 3.5–5.1)
PROT SERPL-MCNC: 6.9 GM/DL (ref 6.4–8.2)
RBC # BLD AUTO: 4.16 X10(6)/MCL (ref 4.5–5.9)
SODIUM SERPL-SCNC: 144 MMOL/L (ref 136–145)
WBC # SPEC AUTO: 4.8 X10(3)/MCL (ref 4.5–11)

## 2019-06-05 ENCOUNTER — HISTORICAL (OUTPATIENT)
Dept: ADMINISTRATIVE | Facility: HOSPITAL | Age: 79
End: 2019-06-05

## 2019-06-05 LAB
BUN SERPL-MCNC: 15 MG/DL (ref 7–18)
CALCIUM SERPL-MCNC: 9 MG/DL (ref 8.5–10.1)
CHLORIDE SERPL-SCNC: 113 MMOL/L (ref 98–107)
CO2 SERPL-SCNC: 31 MMOL/L (ref 21–32)
CREAT SERPL-MCNC: 1.1 MG/DL (ref 0.6–1.3)
CREAT/UREA NIT SERPL: 14
ERYTHROCYTE [DISTWIDTH] IN BLOOD BY AUTOMATED COUNT: 13.5 % (ref 11.5–14.5)
GLUCOSE SERPL-MCNC: 91 MG/DL (ref 74–106)
HCT VFR BLD AUTO: 36 % (ref 40–51)
HGB BLD-MCNC: 11.6 GM/DL (ref 13.5–17.5)
MCH RBC QN AUTO: 28 PG (ref 26–34)
MCHC RBC AUTO-ENTMCNC: 32.2 GM/DL (ref 31–37)
MCV RBC AUTO: 87 FL (ref 80–100)
PLATELET # BLD AUTO: 181 X10(3)/MCL (ref 130–400)
PMV BLD AUTO: 10.2 FL (ref 7.4–10.4)
POTASSIUM SERPL-SCNC: 4 MMOL/L (ref 3.5–5.1)
RBC # BLD AUTO: 4.14 X10(6)/MCL (ref 4.5–5.9)
SODIUM SERPL-SCNC: 146 MMOL/L (ref 136–145)
WBC # SPEC AUTO: 4.9 X10(3)/MCL (ref 4.5–11)

## 2019-06-12 ENCOUNTER — HISTORICAL (OUTPATIENT)
Dept: SURGERY | Facility: HOSPITAL | Age: 79
End: 2019-06-12

## 2019-08-06 ENCOUNTER — HISTORICAL (OUTPATIENT)
Dept: ADMINISTRATIVE | Facility: HOSPITAL | Age: 79
End: 2019-08-06

## 2019-08-06 LAB
ABS NEUT (OLG): 3.1 X10(3)/MCL (ref 2.1–9.2)
ALBUMIN SERPL-MCNC: 3.7 GM/DL (ref 3.4–5)
ALBUMIN/GLOB SERPL: 1 RATIO (ref 1.1–2)
ALP SERPL-CCNC: 89 UNIT/L (ref 45–117)
ALT SERPL-CCNC: 17 UNIT/L (ref 12–78)
AST SERPL-CCNC: 24 UNIT/L (ref 15–37)
BASOPHILS # BLD AUTO: 0.03 X10(3)/MCL
BASOPHILS NFR BLD AUTO: 1 %
BILIRUB SERPL-MCNC: 0.5 MG/DL (ref 0.2–1)
BILIRUBIN DIRECT+TOT PNL SERPL-MCNC: 0.2 MG/DL
BILIRUBIN DIRECT+TOT PNL SERPL-MCNC: 0.3 MG/DL
BUN SERPL-MCNC: 19 MG/DL (ref 7–18)
CALCIUM SERPL-MCNC: 9.5 MG/DL (ref 8.5–10.1)
CHLORIDE SERPL-SCNC: 112 MMOL/L (ref 98–107)
CO2 SERPL-SCNC: 28 MMOL/L (ref 21–32)
CREAT SERPL-MCNC: 1.1 MG/DL (ref 0.6–1.3)
DEPRECATED CALCIDIOL+CALCIFEROL SERPL-MC: 35.31 NG/ML (ref 30–80)
EOSINOPHIL # BLD AUTO: 0.1 X10(3)/MCL
EOSINOPHIL NFR BLD AUTO: 2 %
ERYTHROCYTE [DISTWIDTH] IN BLOOD BY AUTOMATED COUNT: 13.2 % (ref 11.5–14.5)
GLOBULIN SER-MCNC: 3.7 GM/ML (ref 2.3–3.5)
GLUCOSE SERPL-MCNC: 87 MG/DL (ref 74–106)
HCT VFR BLD AUTO: 38.8 % (ref 40–51)
HGB BLD-MCNC: 11.7 GM/DL (ref 13.5–17.5)
IMM GRANULOCYTES # BLD AUTO: 0.01 10*3/UL
IMM GRANULOCYTES NFR BLD AUTO: 0 %
LYMPHOCYTES # BLD AUTO: 1.27 X10(3)/MCL
LYMPHOCYTES NFR BLD AUTO: 26 % (ref 13–40)
MCH RBC QN AUTO: 27 PG (ref 26–34)
MCHC RBC AUTO-ENTMCNC: 30.2 GM/DL (ref 31–37)
MCV RBC AUTO: 89.6 FL (ref 80–100)
MONOCYTES # BLD AUTO: 0.42 X10(3)/MCL
MONOCYTES NFR BLD AUTO: 8 % (ref 0–24)
NEUTROPHILS # BLD AUTO: 3.1 X10(3)/MCL
NEUTROPHILS NFR BLD AUTO: 63 X10(3)/MCL
PLATELET # BLD AUTO: 197 X10(3)/MCL (ref 130–400)
PMV BLD AUTO: 10.2 FL (ref 7.4–10.4)
POTASSIUM SERPL-SCNC: 4.3 MMOL/L (ref 3.5–5.1)
PROT SERPL-MCNC: 7.4 GM/DL (ref 6.4–8.2)
RBC # BLD AUTO: 4.33 X10(6)/MCL (ref 4.5–5.9)
SODIUM SERPL-SCNC: 144 MMOL/L (ref 136–145)
WBC # SPEC AUTO: 4.9 X10(3)/MCL (ref 4.5–11)

## 2019-10-09 ENCOUNTER — HISTORICAL (OUTPATIENT)
Dept: ADMINISTRATIVE | Facility: HOSPITAL | Age: 79
End: 2019-10-09

## 2019-10-09 LAB
ABS NEUT (OLG): 2.53 X10(3)/MCL (ref 2.1–9.2)
ALBUMIN SERPL-MCNC: 3.2 GM/DL (ref 3.4–5)
ALBUMIN/GLOB SERPL: 0.9 RATIO (ref 1.1–2)
ALP SERPL-CCNC: 81 UNIT/L (ref 45–117)
ALT SERPL-CCNC: 15 UNIT/L (ref 12–78)
AST SERPL-CCNC: 14 UNIT/L (ref 15–37)
BASOPHILS # BLD AUTO: 0 X10(3)/MCL (ref 0–0.2)
BASOPHILS NFR BLD AUTO: 0 %
BILIRUB SERPL-MCNC: 0.5 MG/DL (ref 0.2–1)
BILIRUBIN DIRECT+TOT PNL SERPL-MCNC: 0.1 MG/DL (ref 0–0.2)
BILIRUBIN DIRECT+TOT PNL SERPL-MCNC: 0.4 MG/DL
BUN SERPL-MCNC: 14 MG/DL (ref 7–18)
CALCIUM SERPL-MCNC: 8.7 MG/DL (ref 8.5–10.1)
CHLORIDE SERPL-SCNC: 112 MMOL/L (ref 98–107)
CO2 SERPL-SCNC: 28 MMOL/L (ref 21–32)
CREAT SERPL-MCNC: 1 MG/DL (ref 0.6–1.3)
EOSINOPHIL # BLD AUTO: 0.1 X10(3)/MCL (ref 0–0.9)
EOSINOPHIL NFR BLD AUTO: 3 %
ERYTHROCYTE [DISTWIDTH] IN BLOOD BY AUTOMATED COUNT: 13.2 % (ref 11.5–14.5)
GLOBULIN SER-MCNC: 3.4 GM/ML (ref 2.3–3.5)
GLUCOSE SERPL-MCNC: 104 MG/DL (ref 74–106)
HCT VFR BLD AUTO: 35.8 % (ref 40–51)
HGB BLD-MCNC: 11.2 GM/DL (ref 13.5–17.5)
IMM GRANULOCYTES # BLD AUTO: 0.01 10*3/UL
IMM GRANULOCYTES NFR BLD AUTO: 0 %
LYMPHOCYTES # BLD AUTO: 0.9 X10(3)/MCL (ref 0.6–4.6)
LYMPHOCYTES NFR BLD AUTO: 23 %
MCH RBC QN AUTO: 27.3 PG (ref 26–34)
MCHC RBC AUTO-ENTMCNC: 31.3 GM/DL (ref 31–37)
MCV RBC AUTO: 87.1 FL (ref 80–100)
MONOCYTES # BLD AUTO: 0.3 X10(3)/MCL (ref 0.1–1.3)
MONOCYTES NFR BLD AUTO: 9 %
NEUTROPHILS # BLD AUTO: 2.53 X10(3)/MCL (ref 2.1–9.2)
NEUTROPHILS NFR BLD AUTO: 64 %
PLATELET # BLD AUTO: 176 X10(3)/MCL (ref 130–400)
PMV BLD AUTO: 10 FL (ref 7.4–10.4)
POTASSIUM SERPL-SCNC: 4 MMOL/L (ref 3.5–5.1)
PROT SERPL-MCNC: 6.6 GM/DL (ref 6.4–8.2)
RBC # BLD AUTO: 4.11 X10(6)/MCL (ref 4.5–5.9)
SODIUM SERPL-SCNC: 144 MMOL/L (ref 136–145)
WBC # SPEC AUTO: 3.9 X10(3)/MCL (ref 4.5–11)

## 2019-11-13 ENCOUNTER — HISTORICAL (OUTPATIENT)
Dept: RADIOLOGY | Facility: HOSPITAL | Age: 79
End: 2019-11-13

## 2020-02-03 ENCOUNTER — HISTORICAL (OUTPATIENT)
Dept: ENDOSCOPY | Facility: HOSPITAL | Age: 80
End: 2020-02-03

## 2020-04-08 ENCOUNTER — HISTORICAL (OUTPATIENT)
Dept: HEMATOLOGY/ONCOLOGY | Facility: CLINIC | Age: 80
End: 2020-04-08

## 2020-04-08 LAB
ABS NEUT (OLG): 2.74 X10(3)/MCL (ref 2.1–9.2)
ALBUMIN SERPL-MCNC: 3.4 GM/DL (ref 3.4–5)
ALBUMIN/GLOB SERPL: 1 RATIO (ref 1.1–2)
ALP SERPL-CCNC: 83 UNIT/L (ref 45–117)
ALT SERPL-CCNC: 16 UNIT/L (ref 12–78)
AST SERPL-CCNC: 12 UNIT/L (ref 15–37)
BASOPHILS # BLD AUTO: 0 X10(3)/MCL (ref 0–0.2)
BASOPHILS NFR BLD AUTO: 1 %
BILIRUB SERPL-MCNC: 0.4 MG/DL (ref 0.2–1)
BILIRUBIN DIRECT+TOT PNL SERPL-MCNC: 0.1 MG/DL (ref 0–0.2)
BILIRUBIN DIRECT+TOT PNL SERPL-MCNC: 0.3 MG/DL
BUN SERPL-MCNC: 18 MG/DL (ref 7–18)
CALCIUM SERPL-MCNC: 8.6 MG/DL (ref 8.5–10.1)
CHLORIDE SERPL-SCNC: 113 MMOL/L (ref 98–107)
CO2 SERPL-SCNC: 27 MMOL/L (ref 21–32)
CREAT SERPL-MCNC: 1 MG/DL (ref 0.6–1.3)
EOSINOPHIL # BLD AUTO: 0.2 X10(3)/MCL (ref 0–0.9)
EOSINOPHIL NFR BLD AUTO: 3 %
ERYTHROCYTE [DISTWIDTH] IN BLOOD BY AUTOMATED COUNT: 13.5 % (ref 11.5–14.5)
GLOBULIN SER-MCNC: 3.5 GM/ML (ref 2.3–3.5)
GLUCOSE SERPL-MCNC: 94 MG/DL (ref 74–106)
HCT VFR BLD AUTO: 35.2 % (ref 40–51)
HGB BLD-MCNC: 11.2 GM/DL (ref 13.5–17.5)
IMM GRANULOCYTES # BLD AUTO: 0.02 10*3/UL
IMM GRANULOCYTES NFR BLD AUTO: 0 %
LYMPHOCYTES # BLD AUTO: 1.4 X10(3)/MCL (ref 0.6–4.6)
LYMPHOCYTES NFR BLD AUTO: 28 %
MCH RBC QN AUTO: 27.9 PG (ref 26–34)
MCHC RBC AUTO-ENTMCNC: 31.8 GM/DL (ref 31–37)
MCV RBC AUTO: 87.6 FL (ref 80–100)
MONOCYTES # BLD AUTO: 0.6 X10(3)/MCL (ref 0.1–1.3)
MONOCYTES NFR BLD AUTO: 12 %
NEUTROPHILS # BLD AUTO: 2.74 X10(3)/MCL (ref 2.1–9.2)
NEUTROPHILS NFR BLD AUTO: 56 %
PLATELET # BLD AUTO: 175 X10(3)/MCL (ref 130–400)
PMV BLD AUTO: 9.8 FL (ref 7.4–10.4)
POTASSIUM SERPL-SCNC: 4 MMOL/L (ref 3.5–5.1)
PROT SERPL-MCNC: 6.9 GM/DL (ref 6.4–8.2)
PSA SERPL-MCNC: 1.1 NG/ML
RBC # BLD AUTO: 4.02 X10(6)/MCL (ref 4.5–5.9)
SODIUM SERPL-SCNC: 142 MMOL/L (ref 136–145)
WBC # SPEC AUTO: 4.9 X10(3)/MCL (ref 4.5–11)

## 2020-05-25 ENCOUNTER — HISTORICAL (OUTPATIENT)
Dept: RADIOLOGY | Facility: HOSPITAL | Age: 80
End: 2020-05-25

## 2020-05-25 LAB
ALBUMIN SERPL-MCNC: 3.5 GM/DL (ref 3.4–5)
ALBUMIN/GLOB SERPL: 0.9 RATIO (ref 1.1–2)
ALP SERPL-CCNC: 82 UNIT/L (ref 45–117)
ALT SERPL-CCNC: 20 UNIT/L (ref 12–78)
AST SERPL-CCNC: 15 UNIT/L (ref 15–37)
BILIRUB SERPL-MCNC: 0.7 MG/DL (ref 0.2–1)
BILIRUBIN DIRECT+TOT PNL SERPL-MCNC: 0.2 MG/DL (ref 0–0.2)
BILIRUBIN DIRECT+TOT PNL SERPL-MCNC: 0.5 MG/DL
BUN SERPL-MCNC: 21 MG/DL (ref 7–18)
CALCIUM SERPL-MCNC: 9.1 MG/DL (ref 8.5–10.1)
CHLORIDE SERPL-SCNC: 114 MMOL/L (ref 98–107)
CO2 SERPL-SCNC: 29 MMOL/L (ref 21–32)
CREAT SERPL-MCNC: 1 MG/DL (ref 0.6–1.3)
GLOBULIN SER-MCNC: 4 GM/ML (ref 2.3–3.5)
GLUCOSE SERPL-MCNC: 92 MG/DL (ref 74–106)
POTASSIUM SERPL-SCNC: 4.5 MMOL/L (ref 3.5–5.1)
PROT SERPL-MCNC: 7.5 GM/DL (ref 6.4–8.2)
SODIUM SERPL-SCNC: 145 MMOL/L (ref 136–145)

## 2020-06-02 ENCOUNTER — HISTORICAL (OUTPATIENT)
Dept: HEMATOLOGY/ONCOLOGY | Facility: CLINIC | Age: 80
End: 2020-06-02

## 2020-06-02 LAB
ABS NEUT (OLG): 3.11 X10(3)/MCL (ref 2.1–9.2)
ALBUMIN SERPL-MCNC: 3.3 GM/DL (ref 3.4–5)
ALBUMIN/GLOB SERPL: 0.9 RATIO (ref 1.1–2)
ALP SERPL-CCNC: 75 UNIT/L (ref 45–117)
ALT SERPL-CCNC: 18 UNIT/L (ref 12–78)
AST SERPL-CCNC: 17 UNIT/L (ref 15–37)
BASOPHILS # BLD AUTO: 0 X10(3)/MCL (ref 0–0.2)
BASOPHILS NFR BLD AUTO: 0 %
BILIRUB SERPL-MCNC: 0.3 MG/DL (ref 0.2–1)
BILIRUBIN DIRECT+TOT PNL SERPL-MCNC: <0.1 MG/DL (ref 0–0.2)
BILIRUBIN DIRECT+TOT PNL SERPL-MCNC: ABNORMAL MG/DL
BUN SERPL-MCNC: 19 MG/DL (ref 7–18)
CALCIUM SERPL-MCNC: 8.9 MG/DL (ref 8.5–10.1)
CHLORIDE SERPL-SCNC: 112 MMOL/L (ref 98–107)
CO2 SERPL-SCNC: 28 MMOL/L (ref 21–32)
CREAT SERPL-MCNC: 1 MG/DL (ref 0.6–1.3)
EOSINOPHIL # BLD AUTO: 0.1 X10(3)/MCL (ref 0–0.9)
EOSINOPHIL NFR BLD AUTO: 2 %
ERYTHROCYTE [DISTWIDTH] IN BLOOD BY AUTOMATED COUNT: 13.3 % (ref 11.5–14.5)
GLOBULIN SER-MCNC: 3.8 GM/ML (ref 2.3–3.5)
GLUCOSE SERPL-MCNC: 90 MG/DL (ref 74–106)
HCT VFR BLD AUTO: 36.3 % (ref 40–51)
HGB BLD-MCNC: 11.2 GM/DL (ref 13.5–17.5)
IMM GRANULOCYTES # BLD AUTO: 0.01 10*3/UL
IMM GRANULOCYTES NFR BLD AUTO: 0 %
LYMPHOCYTES # BLD AUTO: 1.1 X10(3)/MCL (ref 0.6–4.6)
LYMPHOCYTES NFR BLD AUTO: 22 %
MCH RBC QN AUTO: 27.1 PG (ref 26–34)
MCHC RBC AUTO-ENTMCNC: 30.9 GM/DL (ref 31–37)
MCV RBC AUTO: 87.9 FL (ref 80–100)
MONOCYTES # BLD AUTO: 0.6 X10(3)/MCL (ref 0.1–1.3)
MONOCYTES NFR BLD AUTO: 12 %
NEUTROPHILS # BLD AUTO: 3.11 X10(3)/MCL (ref 2.1–9.2)
NEUTROPHILS NFR BLD AUTO: 64 %
PLATELET # BLD AUTO: 185 X10(3)/MCL (ref 130–400)
PMV BLD AUTO: 10.2 FL (ref 7.4–10.4)
POTASSIUM SERPL-SCNC: 4.2 MMOL/L (ref 3.5–5.1)
PROT SERPL-MCNC: 7.1 GM/DL (ref 6.4–8.2)
RBC # BLD AUTO: 4.13 X10(6)/MCL (ref 4.5–5.9)
SODIUM SERPL-SCNC: 142 MMOL/L (ref 136–145)
WBC # SPEC AUTO: 4.9 X10(3)/MCL (ref 4.5–11)

## 2020-11-17 ENCOUNTER — HISTORICAL (OUTPATIENT)
Dept: CARDIOLOGY | Facility: HOSPITAL | Age: 80
End: 2020-11-17

## 2020-11-17 LAB
BUN SERPL-MCNC: 18.7 MG/DL (ref 8.4–25.7)
CALCIUM SERPL-MCNC: 9.1 MG/DL (ref 8.8–10)
CHLORIDE SERPL-SCNC: 113 MMOL/L (ref 98–107)
CO2 SERPL-SCNC: 21 MMOL/L (ref 23–31)
CREAT SERPL-MCNC: 1.01 MG/DL (ref 0.73–1.18)
CREAT/UREA NIT SERPL: 19
GLUCOSE SERPL-MCNC: 139 MG/DL (ref 82–115)
POTASSIUM SERPL-SCNC: 4.3 MMOL/L (ref 3.5–5.1)
SODIUM SERPL-SCNC: 143 MMOL/L (ref 136–145)

## 2020-12-17 ENCOUNTER — HISTORICAL (OUTPATIENT)
Dept: ADMINISTRATIVE | Facility: HOSPITAL | Age: 80
End: 2020-12-17

## 2020-12-17 LAB
CHOLEST SERPL-MCNC: 168 MG/DL
CHOLEST/HDLC SERPL: 4 {RATIO} (ref 0–5)
HDLC SERPL-MCNC: 42 MG/DL (ref 35–60)
LDLC SERPL CALC-MCNC: 112 MG/DL (ref 50–140)
TRIGL SERPL-MCNC: 72 MG/DL (ref 34–140)
VLDLC SERPL CALC-MCNC: 14 MG/DL

## 2020-12-22 ENCOUNTER — HISTORICAL (OUTPATIENT)
Dept: ADMINISTRATIVE | Facility: HOSPITAL | Age: 80
End: 2020-12-22

## 2020-12-22 LAB
ABS NEUT (OLG): 2.35 X10(3)/MCL (ref 2.1–9.2)
ALBUMIN SERPL-MCNC: 3.5 GM/DL (ref 3.4–4.8)
ALBUMIN/GLOB SERPL: 1.1 RATIO (ref 1.1–2)
ALP SERPL-CCNC: 73 UNIT/L (ref 40–150)
ALT SERPL-CCNC: 7 UNIT/L (ref 0–55)
AST SERPL-CCNC: 16 UNIT/L (ref 5–34)
BASOPHILS # BLD AUTO: 0 X10(3)/MCL (ref 0–0.2)
BASOPHILS NFR BLD AUTO: 0 %
BILIRUB SERPL-MCNC: 0.4 MG/DL
BILIRUBIN DIRECT+TOT PNL SERPL-MCNC: 0.2 MG/DL (ref 0–0.5)
BILIRUBIN DIRECT+TOT PNL SERPL-MCNC: 0.2 MG/DL (ref 0–0.8)
BUN SERPL-MCNC: 22 MG/DL (ref 8.4–25.7)
CALCIUM SERPL-MCNC: 9 MG/DL (ref 8.8–10)
CHLORIDE SERPL-SCNC: 112 MMOL/L (ref 98–107)
CO2 SERPL-SCNC: 24 MMOL/L (ref 23–31)
CREAT SERPL-MCNC: 0.97 MG/DL (ref 0.73–1.18)
EOSINOPHIL # BLD AUTO: 0.1 X10(3)/MCL (ref 0–0.9)
EOSINOPHIL NFR BLD AUTO: 3 %
ERYTHROCYTE [DISTWIDTH] IN BLOOD BY AUTOMATED COUNT: 13.6 % (ref 11.5–14.5)
GLOBULIN SER-MCNC: 3.3 GM/DL (ref 2.4–3.5)
GLUCOSE SERPL-MCNC: 99 MG/DL (ref 82–115)
HCT VFR BLD AUTO: 37.3 % (ref 40–51)
HGB BLD-MCNC: 11.5 GM/DL (ref 13.5–17.5)
IMM GRANULOCYTES # BLD AUTO: 0.01 10*3/UL
IMM GRANULOCYTES NFR BLD AUTO: 0 %
LYMPHOCYTES # BLD AUTO: 1.1 X10(3)/MCL (ref 0.6–4.6)
LYMPHOCYTES NFR BLD AUTO: 27 %
MCH RBC QN AUTO: 27 PG (ref 26–34)
MCHC RBC AUTO-ENTMCNC: 30.8 GM/DL (ref 31–37)
MCV RBC AUTO: 87.6 FL (ref 80–100)
MONOCYTES # BLD AUTO: 0.4 X10(3)/MCL (ref 0.1–1.3)
MONOCYTES NFR BLD AUTO: 11 %
NEUTROPHILS # BLD AUTO: 2.35 X10(3)/MCL (ref 2.1–9.2)
NEUTROPHILS NFR BLD AUTO: 59 %
PLATELET # BLD AUTO: 196 X10(3)/MCL (ref 130–400)
PMV BLD AUTO: 10.1 FL (ref 7.4–10.4)
POTASSIUM SERPL-SCNC: 4.6 MMOL/L (ref 3.5–5.1)
PROT SERPL-MCNC: 6.8 GM/DL (ref 5.8–7.6)
RBC # BLD AUTO: 4.26 X10(6)/MCL (ref 4.5–5.9)
SODIUM SERPL-SCNC: 142 MMOL/L (ref 136–145)
WBC # SPEC AUTO: 4 X10(3)/MCL (ref 4.5–11)

## 2021-06-22 ENCOUNTER — HISTORICAL (OUTPATIENT)
Dept: ADMINISTRATIVE | Facility: HOSPITAL | Age: 81
End: 2021-06-22

## 2021-06-22 LAB
ABS NEUT (OLG): 2.89 X10(3)/MCL (ref 2.1–9.2)
ALBUMIN SERPL-MCNC: 3.7 GM/DL (ref 3.4–4.8)
ALBUMIN/GLOB SERPL: 1.1 RATIO (ref 1.1–2)
ALP SERPL-CCNC: 83 UNIT/L (ref 40–150)
ALT SERPL-CCNC: 7 UNIT/L (ref 0–55)
AST SERPL-CCNC: 14 UNIT/L (ref 5–34)
BASOPHILS # BLD AUTO: 0 X10(3)/MCL (ref 0–0.2)
BASOPHILS NFR BLD AUTO: 1 %
BILIRUB SERPL-MCNC: 0.7 MG/DL
BILIRUBIN DIRECT+TOT PNL SERPL-MCNC: 0.2 MG/DL (ref 0–0.5)
BILIRUBIN DIRECT+TOT PNL SERPL-MCNC: 0.5 MG/DL (ref 0–0.8)
BUN SERPL-MCNC: 14.1 MG/DL (ref 8.4–25.7)
CALCIUM SERPL-MCNC: 9.7 MG/DL (ref 8.8–10)
CHLORIDE SERPL-SCNC: 109 MMOL/L (ref 98–107)
CO2 SERPL-SCNC: 26 MMOL/L (ref 23–31)
CREAT SERPL-MCNC: 1.08 MG/DL (ref 0.73–1.18)
EOSINOPHIL # BLD AUTO: 0.1 X10(3)/MCL (ref 0–0.9)
EOSINOPHIL NFR BLD AUTO: 2 %
ERYTHROCYTE [DISTWIDTH] IN BLOOD BY AUTOMATED COUNT: 13.7 % (ref 11.5–14.5)
GLOBULIN SER-MCNC: 3.5 GM/DL (ref 2.4–3.5)
GLUCOSE SERPL-MCNC: 97 MG/DL (ref 82–115)
HCT VFR BLD AUTO: 38.5 % (ref 40–51)
HGB BLD-MCNC: 11.9 GM/DL (ref 13.5–17.5)
IMM GRANULOCYTES # BLD AUTO: 0.01 10*3/UL
IMM GRANULOCYTES NFR BLD AUTO: 0 %
LYMPHOCYTES # BLD AUTO: 1.4 X10(3)/MCL (ref 0.6–4.6)
LYMPHOCYTES NFR BLD AUTO: 29 %
MCH RBC QN AUTO: 26.8 PG (ref 26–34)
MCHC RBC AUTO-ENTMCNC: 30.9 GM/DL (ref 31–37)
MCV RBC AUTO: 86.7 FL (ref 80–100)
MONOCYTES # BLD AUTO: 0.6 X10(3)/MCL (ref 0.1–1.3)
MONOCYTES NFR BLD AUTO: 11 %
NEUTROPHILS # BLD AUTO: 2.89 X10(3)/MCL (ref 2.1–9.2)
NEUTROPHILS NFR BLD AUTO: 58 %
NRBC BLD AUTO-RTO: 0 % (ref 0–0.2)
PLATELET # BLD AUTO: 194 X10(3)/MCL (ref 130–400)
PMV BLD AUTO: 9.5 FL (ref 7.4–10.4)
POTASSIUM SERPL-SCNC: 5.3 MMOL/L (ref 3.5–5.1)
PROT SERPL-MCNC: 7.2 GM/DL (ref 5.8–7.6)
RBC # BLD AUTO: 4.44 X10(6)/MCL (ref 4.5–5.9)
SODIUM SERPL-SCNC: 143 MMOL/L (ref 136–145)
WBC # SPEC AUTO: 5 X10(3)/MCL (ref 4.5–11)

## 2021-06-29 ENCOUNTER — HISTORICAL (OUTPATIENT)
Dept: ADMINISTRATIVE | Facility: HOSPITAL | Age: 81
End: 2021-06-29

## 2021-06-29 LAB
ABS NEUT (OLG): 2.99 X10(3)/MCL (ref 2.1–9.2)
ALBUMIN SERPL-MCNC: 3.5 GM/DL (ref 3.4–4.8)
ALBUMIN/GLOB SERPL: 1 RATIO (ref 1.1–2)
ALP SERPL-CCNC: 81 UNIT/L (ref 40–150)
ALT SERPL-CCNC: <5 UNIT/L (ref 0–55)
AST SERPL-CCNC: 12 UNIT/L (ref 5–34)
BASOPHILS # BLD AUTO: 0 X10(3)/MCL (ref 0–0.2)
BASOPHILS NFR BLD AUTO: 0 %
BILIRUB SERPL-MCNC: 0.5 MG/DL
BILIRUBIN DIRECT+TOT PNL SERPL-MCNC: 0.2 MG/DL (ref 0–0.5)
BILIRUBIN DIRECT+TOT PNL SERPL-MCNC: 0.3 MG/DL (ref 0–0.8)
BUN SERPL-MCNC: 14.6 MG/DL (ref 8.4–25.7)
CALCIUM SERPL-MCNC: 9.5 MG/DL (ref 8.8–10)
CHLORIDE SERPL-SCNC: 109 MMOL/L (ref 98–107)
CO2 SERPL-SCNC: 26 MMOL/L (ref 23–31)
CREAT SERPL-MCNC: 1.1 MG/DL (ref 0.73–1.18)
EOSINOPHIL # BLD AUTO: 0.2 X10(3)/MCL (ref 0–0.9)
EOSINOPHIL NFR BLD AUTO: 3 %
ERYTHROCYTE [DISTWIDTH] IN BLOOD BY AUTOMATED COUNT: 13.3 % (ref 11.5–14.5)
GLOBULIN SER-MCNC: 3.6 GM/DL (ref 2.4–3.5)
GLUCOSE SERPL-MCNC: 77 MG/DL (ref 82–115)
HCT VFR BLD AUTO: 36.5 % (ref 40–51)
HGB BLD-MCNC: 11.4 GM/DL (ref 13.5–17.5)
IMM GRANULOCYTES # BLD AUTO: 0.01 10*3/UL
IMM GRANULOCYTES NFR BLD AUTO: 0 %
LYMPHOCYTES # BLD AUTO: 1.1 X10(3)/MCL (ref 0.6–4.6)
LYMPHOCYTES NFR BLD AUTO: 23 %
MCH RBC QN AUTO: 27 PG (ref 26–34)
MCHC RBC AUTO-ENTMCNC: 31.2 GM/DL (ref 31–37)
MCV RBC AUTO: 86.3 FL (ref 80–100)
MONOCYTES # BLD AUTO: 0.4 X10(3)/MCL (ref 0.1–1.3)
MONOCYTES NFR BLD AUTO: 9 %
NEUTROPHILS # BLD AUTO: 2.99 X10(3)/MCL (ref 2.1–9.2)
NEUTROPHILS NFR BLD AUTO: 64 %
NRBC BLD AUTO-RTO: 0 % (ref 0–0.2)
PLATELET # BLD AUTO: 189 X10(3)/MCL (ref 130–400)
PMV BLD AUTO: 10 FL (ref 7.4–10.4)
POTASSIUM SERPL-SCNC: 4.3 MMOL/L (ref 3.5–5.1)
PROT SERPL-MCNC: 7.1 GM/DL (ref 5.8–7.6)
RBC # BLD AUTO: 4.23 X10(6)/MCL (ref 4.5–5.9)
SODIUM SERPL-SCNC: 141 MMOL/L (ref 136–145)
WBC # SPEC AUTO: 4.7 X10(3)/MCL (ref 4.5–11)

## 2022-04-10 ENCOUNTER — HISTORICAL (OUTPATIENT)
Dept: ADMINISTRATIVE | Facility: HOSPITAL | Age: 82
End: 2022-04-10

## 2022-04-24 VITALS
HEIGHT: 69 IN | DIASTOLIC BLOOD PRESSURE: 81 MMHG | WEIGHT: 184.06 LBS | SYSTOLIC BLOOD PRESSURE: 143 MMHG | BODY MASS INDEX: 27.26 KG/M2 | OXYGEN SATURATION: 97 %

## 2022-04-29 NOTE — H&P
* Final Report *  Update to H&P [facility]     Patient:   Emmanuel Colmenares             MRN: [id number]            FIN: [id number]               Age:   76 years     Sex:  Male     :  1940   Associated Diagnoses:   None   Author:   Js Oglesby MD      Health Status   The H&P was reviewed, the patient was examined, and there are no changes to the patient's condition..The H&P was reviewed, the patient was examined, and the following changes to the patient's condition are noted:.  Result type: Progress Note-Physician  Result date: May 05, 2017 8:22 CDT  Result status: Auth (Verified)  Result title: Update to H&P [facility]  Performed by: Js Oglesby MD on May 05, 2017 8:22 CDT  Verified by: Js Oglesby MD on May 05, 2017 8:22 CDT  Encounter info: [id number], [facility], Day Surgery, 2017 - 2017    Doc has been moved by HIM specialist.

## 2022-04-29 NOTE — OP NOTE
After procedure was explained with risks benefits and alternative consent was signed.  Patient brought to fluoroscopy suite and placed prone on the fluoroscopy table . Patient was hooked up to vital sign monitoring as well as being given IV sedation achieving good level of conscious sedation for the procedure. Patient was prepped in usual sterile fashion without topical iodine  After fluoroscopic targeting of her lumbosacral spine the target sites for the right L4 and the right S1 transforaminal injections were identified. A skin wheal and a tract down to the target site, which is a 6 oclock position of the superior pedicle, was anesthetized using 1% buffered lidocaine. A 22-gauge 3-1/2 inch spinal needles were then introduced and advanced under triplanar fluoroscopy at each level. When the foramen worse approached the needles were successfully maneuvered and docked at the 6 oclock position of the superior pedicle. Needle confirmation was made on all 3 views. After negative aspiration for blood and cerebral spinal fluid, 0.3 mL of Isovue-200 was injected at each level and appropriate epidurograms were visualized without any intrathecal or intravascular uptake. Again after negative aspiration, patient then received her injected at each level which consisted of 1.5 mL of equal parts dexamethasone 10 mg/mL, 1% lidocaine and 0.5% bupivacaine.  The procedures were then repeated on the (L)L4 & (L)S1 foramen  Patient tolerated  procedures without any complications and was brought to the recovery room in good and stable condition. Post procedure instructions were explained to patient and written instructions were given. Patient will take it easy for 24 hours gradually resuming activity. Will  follow up in my clinic in 2-3 weeks as scheduled. They will call my office with any problems. Patient was observed in the recovery room for approximately 1 hour and discharged home in good and stable condition

## 2022-04-29 NOTE — OP NOTE
Patient:   Emmanuel Colmenares             MRN: [id number]            FIN: [id number]               Age:   76 years     Sex:  Male     :  1940   Associated Diagnoses:   None   Author:   Rosa Franz MD      Date of Procedure: 17       Preoperative Diagnosis: Agtaha Nodular Degeneration, Right Eye    Postoperative Diagnosis: same       Procedure: Superficial Keratectomy with Mitomycin C application and bandage contact lens       Attending: Igor Waller MD       Resident: Shasta Franz MD       Anesthesia: Local/Mac       Blood loss: None       The patient was brought to the operating room in supine position and general anesthesia was achieved, and then was prepped and draped in usual sterile fashion using 5% betadine. A speculum was placed on the eye.  A Cachil DeHe blade was used to remove the corneal epithelium.  0.12 forceps were used to peel away the Salzmann nodule.  A richard was used to smooth the corneal bed and the adjacent epithelial edges.  Mitomycin C was applied ot the corneal bed for 45 mins and washed away with copious BSS.  A bandage contact lens was placed.  Drops of Vigamox and PredForte were placed in the eye and a shield was used to cover the eye.  The patient tolerated the procedure well, and was taken to the recovery area in stable condition. The patient was instructed to follow-up for routine post-operative care in 3 days.

## 2022-04-29 NOTE — H&P
* Final Report *  Ophthalmic Examination Visit *     Patient:   Igor Briones             MRN: [id number]            FIN: [id number]               Age:   29 years     Sex:  Male     :  1987   Associated Diagnoses:   None   Author:   Rosa Franz MD      Chief Complaint   2017 14:20 CDT       here for pre-op for sx.   CC: here for pre-op      Review of Systems   Constitutional:  No fever, No chills, No sweats.    Eye:  No icterus, No blurring, No double vision.    Respiratory:  No shortness of breath, No cough, No sputum production.    Cardiovascular:  No chest pain, No palpitations, No syncope.    Gastrointestinal:  No nausea, No vomiting, No abdominal pain.    Genitourinary:  No dysuria, No hematuria, No urethral discharge.    Immunologic:  Not immunocompromised, No recurrent fevers, No malaise.    Integumentary:  No rash, No pruritus, No breakdown.    Neurologic:  Alert and oriented X4.    Psychiatric:  No anxiety, No depression, No hallucinations.    ROS reviewed as documented in chart      Health Status   Allergies:    Allergic Reactions (All)  Severity Not Documented  No Known Allergies- No reactions were documented.,    Allergies (1) Active Reaction  No Known Allergies None Documented     Current medications:  (Selected)   ,    No qualifying data available     Problem list:    All Problems  Obesity / SNOMED CT 1225097298 / Probable,    Active Problems (1)  Obesity         Histories   Past Medical History:    No active or resolved past medical history items have been selected or recorded.   Family History:    No family history items have been selected or recorded.   Procedure history:    Corneal transplant ([id number]).   Social History        Social & Psychosocial Habits    Alcohol  2016  Use: Current    Type: Beer, Liquor, Wine    Frequency: 1-2 times per year    Started at age: 23 Years    Tobacco  2016  Use: Never smoker  .        Physical Examination   VS/Measurements      Impression  and Plan   VA sc  OD: 20/60, PH NI  OS: CF ph NI    IOP 14//16    SLE:  External:WNL OU  L/L/A: WNL OU  C/S: W/Q OU  K: Graft clear, 1 broken suture at 5 o clock, all other sutures intact. 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  - Graft clear, no evidence of rejection  - Cont. PF BID OD  - topography done -- 4.5 D of regular astigmatism- Mrx given at last visit  - All remaining sutures removed today - ofloxacin x 4 days then stop  - Monitor, rejection symptoms reviewed    2) KCN OS  - Inferior cone with scarring; VA today CF  - Tried RGP CL in past but did not help VA  - Pt desires surgical intervention   - Have discussed R/B/A/C of surgery.  Consent obtained.    RTC 7/25/16 for pre-op PKP               Result type: Ophthalmology Office/Clinic Note  Result date: April 07, 2017 17:17 CDT  Result status: Auth (Verified)  Result title: Ophthalmic Examination Visit *  Performed by: Rosa Franz MD on April 07, 2017 17:18 CDT  Verified by: Rosa Franz MD on April 07, 2017 18:15 CDT  Encounter info: [id number], [facility], Clinic Visit, 4/7/2017 - 4/7/2017    Doc has been moved by HIM specialist.

## 2022-04-29 NOTE — PROGRESS NOTES
Patient:   Emmanuel Colmenares             MRN: [id number]            FIN: [id number]               Age:   78 years     Sex:  Male     :  1940   Associated Diagnoses:   None   Author:   Julianna Caballero      pt called & provided CT chest results, CMP, CBC results

## 2022-04-29 NOTE — H&P
Patient:   Emmanuel Colmenares             MRN: [id number]            FIN: [id number]               Age:   78 years     Sex:  Male     :  1940   Associated Diagnoses:   None   Author:   Smith Hall MD      History of Present Illness   Blurred vision right eye      Review of Systems   Constitutional:  Negative.    Eye:  Blurring.    Respiratory:  Negative.    Cardiovascular:  Negative.    Gastrointestinal:  Negative.    Integumentary:  Negative.    Neurologic:  Negative.       Health Status   Allergies:    Allergic Reactions (All)  Severity Not Documented  Povidone iodine topical- Unknown.  Canceled/Inactive Reactions (All)  No Known Allergies,    Allergies (1) Active Reaction  povidone iodine topical unknown   Current medications:  (Selected)   Inpatient Medications  Ordered  Tylenol: 650 mg, form: Tab, Oral, Once, first dose 17 12:00:00 CDT, stop date 17 12:00:00 CDT, 1,023,[id number]  Valium 5 mg oral tablet: 5 mg, form: Tab, Oral, OCOR, first dose 19 6:55:00 CST  cyclopentolate 1% ophthalmic solution: 1 drop(s), form: Soln, OPTH, As Directed, Order duration: 3 dose(s), first dose 19 6:55:00 CST, On Call  mitoMYcin: 0.4 mg, form: Inj-Ophth, Intraocular, Once, first dose 17 8:00:00 CDT, stop date 17 8:00:00 CDT, [id number]  phenylephrine 2.5% ophthalmic solution: 1 drop(s), form: Soln-Opth, OPTH, As Directed, Order duration: 3 dose(s), first dose 19 6:55:00 CST, On Call    Waste Code BKC  proparacaine 0.5% ophthalmic solution: 1 drop(s), form: Soln-Opth, OPTH, Once-Unscheduled, first dose 19 7:55:00 CST  tropicamide 1% ophthalmic soln: 1 drop(s), form: Soln, OPTH, As Directed, Order duration: 3 dose(s), first dose 19 6:55:00 CST, On Call  Pending Complete  midazolam: 2 mg, form: Injection, IV Push, q5min, Order duration: 2 dose(s), first dose 17 7:52:00 CDT, stop date 17 8:01:00 CDT, (up to 5 mg for moderate anxiety),  30,025  Prescriptions  Prescribed  MiraLax oral powder for reconstitution: See Instructions, 17 gm on Monday and Thursday   dissolve in water or juice, # 527 gm, 0 Refill(s), other reason (Rx)  Nitrostat 0.4 mg sublingual tablet: 0.4 mg = 1 tab(s), SL, q5min, PRN PRN for chest pain, # 25 tab(s), 3 Refill(s)  Silvadene 1% topical cream: 1 nando, TOP, BID, apply to anus after bowel movement and cleansing of skin, # 1 gm, 2 Refill(s), Pharmacy: [facility]/pharmacy #[id number]  Zyrtec 10 mg oral tablet: 10 mg = 1 tab(s), Oral, Daily, # 90 tab(s), 3 Refill(s), Pharmacy: [facility]/pharmacy #[id number]  allopurinol 100 mg oral tablet: 100 mg = 1 tab(s), Oral, Daily, for gout prevention (maintenance), # 90 tab(s), 3 Refill(s), Pharmacy: [facility]/pharmacy #[id number]  aspercreme with lidocaine cream: aspercreme with lidocaine cream, See Instructions, apply to right leg as directed, # 1 EA, 0 Refill(s), other reason (Rx)  docusate sodium 50 mg oral capsule: 50 mg = 1 cap(s), Oral, BID, PRN PRN for constipation, # 60 cap(s), 0 Refill(s), other reason (Rx)  gabapentin 400 mg oral capsule: See Instructions, Take 1 capsule in the morning and 2 capsules at bedtime, # 90 cap(s), 5 Refill(s), Pharmacy: [facility]/pharmacy #[id number]  losartan 100 mg oral tablet: 100 mg = 1 tab(s), Oral, Daily, # 90 tab(s), 2 Refill(s), Pharmacy: [facility]/pharmacy #[id number]  tamsulosin 0.4 mg oral capsule: See Instructions, TAKE 1 CAPSULE EVERY DAY, # 90 cap(s), 0 Refill(s), Pharmacy: [facility]/pharmacy #[id number]  Documented Medications  Documented  PRAVASTATIN  TAB 80M mg = 1 tab(s), Oral, Daily  Super B Complex oral tablet: 1 tab(s), Oral, Daily, # 30 tab(s), 0 Refill(s)  aspirin 81 mg oral tablet, CHEWABLE: 81 mg = 1 tab(s), Oral, Daily, # 30 tab(s), 0 Refill(s)   Problem list:    All Problems  Atherosclerosis / SNOMED CT 27934578 / Confirmed  BPH without urinary obstruction / SNOMED CT [id number] / Confirmed  Gout / SNOMED CT 131320829 / Confirmed  Constipation / SNOMED CT 348988782  / Confirmed  Gout / SNOMED CT 173619248 / Confirmed  HTN (hypertension) / SNOMED CT [id number] / Confirmed  Hyperlipemia / SNOMED CT [id number] / Confirmed  Hyperlipidaemia / SNOMED CT 900562088 / Confirmed  Male impotence / SNOMED CT 6898759793 / Confirmed  Joint pain / SNOMED CT [id number] / Confirmed  Cataract of right eye / SNOMED CT 2900524912 / Confirmed  Mononeuritis / SNOMED CT 9S6V9I63-5P1N-5Q02-54E7-G2B77KG24679 / Confirmed  Obesity / SNOMED CT [id number] / Confirmed  Peripheral neuropathy / SNOMED CT 94FSQ06C-[id number] / Confirmed  PVD (peripheral vascular disease) / SNOMED CT [id number] / Confirmed  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  Father  Mother  Brother  Sister  Diabetes mellitus type 1.  Sister  Peripheral vascular disease.  Sister  Cancer of stomach  Brother  Hearing loss associated with syndrome                                                                                                                                                                                                   07-JUN-2017 17:55:49<$>  Sister  Colon cancer  Brother     Social History        Social & Psychosocial Habits    Alcohol  04/01/2015  Use: Past    Type: Beer, Liquor, Wine    Frequency: 3-5 times per week    Started at age: 16 Years    Stopped at age: 62 Years    Has alcohol use interfered with work or home life? No    Do you ever drink more than intended? No    Has anyone been hurt or at risk by your drinking? No    Ready to change: No    Concerns about alcohol use in household: No    Employment/School  11/29/2016  Status: Retired     Previous employment/school: [employer]    Highest education: High school    Exercise  04/01/2015 Risk Assessment: Does not exercise    07/17/2017  Times per week: 1-2 times/week    Exercise type: Walking    Home/Environment  11/29/2016  Lives with: Children    Living situation: Home/Independent    Alcohol abuse in household: No    Substance abuse in household: No    Smoker in household: No    Injuries/Abuse/Neglect in household: No    Feels unsafe at home: No    Safe place to go: Yes    Agency(s)/Others notified: No    Family/Friends available to help: Yes    Concern for family members at home: No    Major illness in household: No    Financial concerns: No    Nutrition/Health  04/01/2015  Type of diet: Regular    Sexual  04/01/2015  Sexually active: Yes    01/10/2019  Sexually active: No    History of sexual abuse: No    What is your current gender identity? (Check all that apply) Identifies as male    Substance Abuse  04/01/2015 Risk Assessment: Denies Substance Abuse    12/07/2016  Use: Never    Tobacco  01/28/2019  Use: Former smoker, quit more    Patient Wants Consult For Cessation Counseling N/A.        Physical Examination   Vital Signs   1/30/2019 8:30 CST       Temperature Oral          36.5 DegC  (Unauth)                            Heart Rate Monitored      82 bpm  (Unauth)                            Respiratory Rate          16 br/min  (Unauth)                            SpO2                      98 %  (Unauth)                            Oxygen Therapy            Room air  (Unauth)                            Systolic Blood Pressure   124 mmHg  (Unauth)                            Diastolic Blood Pressure  67 mmHg  (Unauth)                            Mean Arterial Pressure, Cuff              86 mmHg  (Unauth)                            Blood Pressure Location   Left arm  (Unauth)    NCAT, AAOx3  regular rate  nonlabored respirations  abd soft, nontender  skin warm, dry  grossly neurologically intact          Impression and Plan   to OR for CEIOL right eye on 1/31/19

## 2022-04-29 NOTE — OP NOTE
Patient:   Emmanuel Colmenares             MRN: [id number]            FIN: [id number]               Age:   78 years     Sex:  Male     :  1940   Associated Diagnoses:   None   Author:   Shukri Johnson MD             [facility] Surgery Operative Report    Date of Procedure:  2018    Staff: Jaelyn    Resident:  Leana Johnson    Pre Operative Diagnosis:  EUA Anoscopy for  Post/Op survilance Squamous carcinoma of the Anus    Post Operative Diagnosis:  EUA Anoscopy for  Post/Op survilance Squamous carcinoma of the Anus    Operation Performed:  EUA Anoscopy       Anesthesia: General    Indication:  Pt is a 79 yo m with PMH of Squamous carcinoma of the Anus diagnosed in 2016 . Risk, benifits, and alternatives were discussed and pt agrees with procedure.      Procedure in Detail:  After informed consent was obtained, including risks, benefits, and alternatives, the patient was brought to the operating theater, prepped and draped in the standard sterile fashion.  Placed in the lithotomy position.  Attention was turned to the anus.  The anus was dilated with a small Hill-Cox retractor and the entirety of the anal canal and anal margin was examined.  There was no gross evidence of disease.  No gross lesions, masses, ulcerations were identifiable.  No abnormal tissue on digital exam or on direct examination.  Thus, we decided that no biopsies were needed at this time.  At this time, there was no disease that was identifiable.  We concluded the procedure and patient was extubated without incident.  The patient tolerated the procedure well.  The patient's family was notified.      Patient Condition:  Stable    Complications: None    Estimated blood loss:  0ml    Specimens:  0ml    Drains placed.  0    Operative Findings. None    PLAN:  Pt will continue to return to Surgery clinic for surveillance Anoscopy every 6-12 months for a total of 3 years.

## 2022-04-29 NOTE — OP NOTE
DATE OF SURGERY:        SURGEON:  Nino Hutchison MD    INDICATION FOR PROCEDURE:  Chest pain with CCS class-3 anginal symptoms and PAD with mild claudication pain.    PROCEDURES:    1. Moderate sedation.   2. Right femoral artery access.   3. Selective right and left coronary angiogram.   4. Left heart cardiac catheterization.   5. Left ventriculogram.   6. Bilateral peripheral angiogram.    PROCEDURE IN DETAIL:  The patient was brought to the Cardiac Catheterization Laboratory in a fasting nonsedated state and was prepped and draped in the usual sterile fashion.  2% lidocaine was used in the right groin area as local anesthesia.  Using micropuncture needle, right femoral artery was cannulated.  #5 Malay 11 cm arterial sheath was placed.  Using a JL4 and JR4 catheter, left and right coronary ostium were selectively engaged.  Multiple angiographic views were obtained under fluoroscopy.  Using a pigtail catheter, left heart cardiac catheterization and left ventriculogram was performed.  Pullback from LV to aorta was performed with no gradient across the valve.    RESULTS:  The left main bifurcates in the LAD and left circumflex artery.  Left main has no significant stenosis or disease.       The left circumflex artery gives rise to large obtuse marginal one.  The obtuse marginal one proximally has 20% stenosis.  The mid left circumflex artery after the takeoff of the obtuse marginal one has around 30 to 40% stenosis and distally has another focal area of 20% stenosis.       The LAD is a type-3 vessel that wraps around the apex.  The LAD and its branches have mild atherosclerotic disease.       Right coronary artery is dominant artery giving rise to PDA and small posterolateral artery.  The distal right coronary artery has around 30% stenosis.       Aortic pressure is 130/70/80 with LVEDP of 15 and LVEF of 65% with no wall motion abnormalities.       Bilateral common iliacs are patent.  Bilateral external  iliacs are patent.  Left SFA has mild to moderate 20 to 30% stenosis and also has a distal stent which is patent.       The right SFA has mild atherosclerotic disease.  Right popliteal is patent.    ASSESSMENT & PLAN:  Moderate CAD in the circ and 30% stenosis of the right.  The patient will be on bedrest today and will be discharged home later today.        ______________________________  MD AVTAR Kaplan/GRAYSON  DD:  03/26/2018  Time:  08:02AM  DT:  03/26/2018  Time:  02:14PM  Job #:  [id number]

## 2022-04-29 NOTE — OP NOTE
DATE OF SURGERY:        SURGEON:  Desean Zapata MD    attending physician:  Tremayne Esparza III, MD    PREPROCEDURE DIAGNOSIS:  Anal squamous cell carcinoma, status post Marleen therapy.    POSTPROCEDURE DIAGNOSIS:  Anal squamous cell carcinoma, status post Marleen therapy.    PROCEDURE:  Exam under anesthesia.    FINDINGS:  No obvious evidence of recurrent disease.    INDICATIONS:  This is a 77-year-old male who is status post Marleen therapy for anal squamous cell carcinoma.  He is here for a surveillance exam and thus was taken back for exam under anesthesia.    TECHNIQUE IN DETAIL:  After informed consent was obtained, including risks, benefits, and alternatives, the patient was brought to the operating theater, prepped and draped in the standard sterile fashion.  Placed in the lithotomy position.  Attention was turned to the anus.  The anus was dilated with a small Hill-Cox retractor and the entirety of the anal canal and anal margin was examined.  There was no gross evidence of disease.  No gross lesions, masses, ulcerations were identifiable.  No abnormal tissue on digital exam or on direct examination.  Thus, we decided that no biopsies were needed at this time.  At this time, there was no disease that was identifiable.  We concluded the procedure.    COMPLICATIONS:  None.    SPECIMENS:  None.    PLAN:  Return to clinic in 3 months for followup.  He will have followup with Oncology.  At this time, patient is stage I prior to his Marleen therapy with what appears to be a complete pathologic response, though we will await post [facility] therapy imaging to make final determinations.      BLOOD LOSS:  None.        ______________________________  MD DEBORAH Carney/SHUKRIL  DD:  01/03/2018  Time:  10:08AM  DT:  01/03/2018  Time:  10:45AM  Job #:  [id number]    cc: MD Desean Guillen III, MD

## 2022-04-29 NOTE — PROGRESS NOTES
Patient:   Emmanuel Colmenares             MRN: [id number]            FIN: [id number]               Age:   77 years     Sex:  Male     :  1940   Associated Diagnoses:   None   Author:   Julianna Caballero      Pt contacted after several attempts & provided CT head results.

## 2022-04-29 NOTE — OP NOTE
Patient:   Emmanuel Colmenares             MRN: [id number]            FIN: [id number]               Age:   78 years     Sex:  Male     :  1940   Associated Diagnoses:   None   Author:   Smith Hall MD      URGON: Smith Hall MD     ATTENDING: ROBERTO Mcconnell MD     PRE-OPERATIVE DIAGNOSIS: Nuclear sclerotic age related cataract, OD     POST-OPERATIVE DIAGNOSIS: Nuclear sclerotic age related cataract, OD     DATE OF SURGERY:  2019     PROCEDURES: Phacoemulsification with intraocular lens, OD  IMPLANT:  Toric [id number]  +10.5 D     ANESTHESIA: MAC with intracameral lidocaine 1% , retrobulbar block lidocaine/bupivocaine  COMPLICATIONS: IFIS     ESTIMATED BLOOD LOSS: None     INDICATIONS:  The patient has a history of painless progressive visual loss and difficulty with activities of daily  living secondary to cataract formation. After a thorough discussion of the risks, benefits and  alternatives to cataract surgery, including, but not limited to, the rare risks of infection, retinal  detachment, need for additional surgery, loss of vision and even loss of the eye, the patient  voices good understanding and desires to proceed.     DESCRIPTION OF PROCEDURE:  The patients IOL calculations and lens selection were reviewed and confirmed. After  verification and marking of the proper eye in the preop holding area, several sets of 1%  Mydriacil, 2.5% phenylephrine, and 1% Cyclogyl drops were then placed. The patient was  brought to the operating room in supine position where the eye was prepped and draped in  standard sterile fashion using hibiclens and a lid speculum placed. Topical tetracaine was  used in addition to the preoperative anesthesia. A paracentesis incision was created through  which 1% lidocaine was injected followed by viscoelastic was used to fill the anterior chamber.   A 2.4mm keratome blade was used to  create a biplanar temporal sclerocorneal  incision due to a hx of superficial  keratecomy and peripheral corneal thinning. A cystotome and Utrata forceps was then used  to fashion a continuous curvilinear capsulorrhexis. Hydrodissection with  BSS was performed using a hydrodissection cannula. Phacoemulsification of the nucleus was  carried out with a flip and chip technique, and all remaining epinuclear and cortical  material was removed. During this step, the iris was noted to constrict and limit view . 20 degrees was marked at the limbus for alignment of the toric. The eye was reformed using viscoelastic and the intraocular lens was  implanted into the capsular bag. The lens was rotated to be in alignment with 20 degrees.  All remaining viscoelastic was removed from the eye. At the  end of the case, the lens was well-aligned and all wounds were found to be watertight. Drops of vigamox and predforte were instilled and an eye shield placed over the eye. The patient tolerated  the procedure well and was taken to the recovery area in stable condition. The patient was  instructed to follow-up for routine post-operative care the following morning

## 2022-04-29 NOTE — OP NOTE
DATE OF SURGERY:    08/03/2017    SURGEON:  Roland Sanchez MD    PROCEDURE:  Attempted L4-5 interlaminar epidural steroid injection, aborted secondary to bony overgrowth, completed procedure is L5-S1 right paramedian epidural steroid injection.    INDICATIONS:  Lumbar spondylosis, lumbar degenerative disc disease, lumbar radicular pain.    SEDATION:  230 mg of propofol IV push.     CRNA is Ray Ferguson  Anesthesiologist is Dr. Lynn    CONTRAST ALLERGY:  None.    ESTIMATED BLOOD LOSS:  Less than 3 cc.    COMPLICATIONS:  None.    PROCEDURE IN DETAIL:    After procedure was explained with risks benefits and alternatives, consent was signed. Patient was brought into the fluoroscopy suite and placed prone on the fluoroscopy table. Patient was hooked up to vital sign monitoring as well as being given IV sedation, achieving a good level conversation for the procedure.  After fluoroscopic targeting of the  lumbosacral spine, the L5-S1 interlaminar space was identified. This area was then prepped and draped in the usual sterile fashion. A skin wheal and a tract down to the target site was anesthetized using 1% buffered lidocaine. An 18-gauge 3-1/2 inch Touhy needle was then introduced and advanced under biplanar fluoroscopy. When the epidural space was approached, loss-of-resistance technique was utilized to enter the epidural space. Needle placement was confirmed on both AP and lateral fluoroscopic imaging. After negative aspiration for blood and cerebral spinal fluid, 2 mL of Isovue-200 was injected, and an appropriate epidurogram without any intrathecal or intravascular uptake was noted. Again after negative aspiration, patient then received an injectate consisting of 2 mL of 1% lidocaine, 2 mL of 0.5 cm bupivacaine, and 10 mg of dexamethasone.  Patient tolerated the procedure without any complications. Was brought to the recovery room in good and stable condition. Post procedure instructions were explained and  patient was given written instructions. Patient will take it easy for 24 hours, gradually resuming activities. Will call my office with any problems. Patient will follow up in the office as scheduled. Patient was evaluated in the recovery room for approximately 1 hour and discharged to home in good and stable condition.        ______________________________  MD GRISELDA Dorsey/CS  DD:  08/05/2017  Time:  08:17AM  DT:  08/07/2017  Time:  08:29AM  Job #:  [id number]    cc: Charly Gonzalez MD

## 2022-05-03 NOTE — HISTORICAL [facility] CERNER
This is a historical note converted from Mary. Formatting and pictures may have been removed.  Please reference Mary for original formatting and attached multimedia. Interval H&P  Patient examined and chart reviewed.? No changes to the H&P below.? Ok to proceed with surgery.  ?   Karena Madden  General Surgery, PGY-1  ?   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  Marleen protocol.  Pt reports intermittent constipation relieved but unknown?OTC herbal solution.  Pt has no additional complaints.  PSH EUA January 3rd 2018  Review of Systems  10 point review of systems??reviewed and is otherwise negative  Physical Exam  ???Vitals & Measurements  ??T:?36.4? ?C (Oral)? HR:?59(Peripheral)? RR:?16? BP:?116/66? WT:?87.3?kg? WT:?87.3?kg?  NAD AOX3  Regular rate by radial pulse  No increased work of breathing, normal respiratory effort on exam.  ABD soft, NT, ND.?  Ext 2+ radial and distal?pulses.?  CAREY normal - No blood, no lesions.  Assessment/Plan  ?   EUA July?20th 2018 for surveillance exam  ? Problem List/Past Medical History  ??Ongoing  ??Atherosclerosis  ??BPH without urinary obstruction  ??Cataract of right eye  ??Cervical stenosis of spinal canal  ??Constipation  ??Gout  ??Gout  ??HTN (hypertension)  ??Hyperlipemia  ??Hyperlipidaemia  ??Joint pain  ??Male impotence  ??Mononeuritis  ??Obesity  ??Peripheral neuropathy  ??PVD (peripheral vascular disease)  ??Salzmann nodular dystrophy  ??Squamous cell cancer, anus  ?Historical  ??Allergic rhinitis, seasonal  ??CAD (coronary atherosclerotic disease)  ??Chronic kidney disease (CKD)  ??Claudication in peripheral vascular disease  ??Colon cancer  ??Constipation  ??Diverticulosis of colon without diverticulitis  ??Gout  ??H/O gastritis  ??History of acoustic neuroma  ??History of colon polyps  ??HLD  (hyperlipidemia)  ??HTN (hypertension)  ??PAD (peripheral artery disease)  ??Peripheral neuropathy  ??Prostate enlargement  ??PVD (peripheral vascular disease)  ??SOB (shortness of breath) on exertion  ??Tinea cruris  Procedure/Surgical History  Catheter placement in coronary artery(s) for coronary angiography, including intraprocedural injection(s) for coronary angiography, imaging supervision and interpretation; with left heart catheterization including intraprocedural injection(s) for left catrachito (03/26/2018), Fluoroscopy of Left Heart using Low Osmolar Contrast (03/26/2018), Fluoroscopy of Left Lower Extremity Arteries using Low Osmolar Contrast (03/26/2018), Fluoroscopy of Multiple Coronary Arteries using Low Osmolar Contrast (03/26/2018), Fluoroscopy of Right Lower Extremity Arteries using Low Osmolar Contrast (03/26/2018), Measurement of Cardiac Sampling and Pressure, Left Heart, Percutaneous Approach (03/26/2018), Anoscopy; diagnostic, including collection of specimen(s) by brushing or washing, when performed (separate procedure) (01/03/2018), Exam Under Anesthesia Rectal (None) (01/03/2018), Inspection of Lower Intestinal Tract, Via Natural or Artificial Opening Endoscopic (01/03/2018), 29702 - INJ FORAMEN EPIDURAL LUM / SAC 1 LEVEL (None) (10/12/2017), 24608 - INJ FORAMEN EPIDURAL LUM / SAC EA ADDL LEVEL (None) (10/12/2017), Injection(s), anesthetic agent and/or steroid, transforaminal epidural, with imaging guidance (fluoroscopy or CT); lumbar or sacral, each additional level (List separately in addition to code for primary procedure) (10/12/2017), Injection(s), anesthetic agent and/or steroid, transforaminal epidural, with imaging guidance (fluoroscopy or CT); lumbar or sacral, single level (10/12/2017), Introduction of Anesthetic Agent into Spinal Canal, Percutaneous Approach (10/12/2017), Introduction of Anti-inflammatory into Spinal Canal, Percutaneous Approach (10/12/2017), 56153-JQX DX/THER SBST  INTRLMNR LMBR/SAC W/IMG GDN (None) (08/03/2017), Fluoroscopy of Spinal Cord using Low Osmolar Contrast (08/03/2017), Injection(s), of diagnostic or therapeutic substance(s) (eg, anesthetic, antispasmodic, opioid, steroid, other solution), not including neurolytic substances, including needle or catheter placement, interlaminar epidural or subarac 19-DEC-2017 11:22:23<$> (08/03/2017), Introduction of Anti-inflammatory into Spinal Canal, Percutaneous Approach (08/03/2017), Corneal Transplant (Right) (05/05/2017), Excision of lesion, cornea (keratectomy, lamellar, partial), except pterygium (05/05/2017), Excision of Right Cornea, External Approach (05/05/2017), Anoscopy; diagnostic, including collection of specimen(s) by brushing or washing, when performed (separate procedure) (04/10/2017), Exam Under Anesthesia Rectal (None) (04/10/2017), Anorectal exam, surgical, requiring anesthesia (general, spinal, or epidural), diagnostic (12/14/2016), Exam Under Anesthesia Rectal (None) (12/14/2016), Inspection of Gastrointestinal Tract, Via Natural or Artificial Opening Endoscopic Approach (12/14/2016), Biopsy Gastrointestional (11/18/2016), Colonoscopy (11/18/2016), Colonoscopy, flexible; with biopsy, single or multiple (11/18/2016), Excision of Rectum, Via Natural or Artificial Opening Endoscopic, Diagnostic (11/18/2016), PTCA - Percutaneous transluminal coronary angioplasty (05/02/2011), Hernia Repair (2008), chest tube in right lung twice due to pneumothorax (01/01/1993), arthroscopic surgery of right knee (01/01/1991), right ear TM surgery (01/01/1975), frontal skull fracture with steel plate (01/01/1971), left femoral popliteal bypass (01/01/1971), ventral hernia surgery (01/01/1971), R LE Stent.  Medications  ?Inpatient  ??mitoMYcin, 0.4 mg= 1 mL, Intraocular, Once  ??Tylenol, 650 mg= 2 tab(s), Oral, Once  ?Home  ??allopurinol 100 mg oral tablet, 100 mg= 1 tab(s), Oral, Daily, 3 refills  ??Anusol HC rectal ointment, See  Instructions, 2 refills,? ?Not Taking per Prescriber  ??aspercreme with lidocaine cream, See Instructions  ??aspirin 81 mg oral tablet, CHEWABLE, 81 mg= 1 tab(s), Oral, Daily  ??Cialis 20 mg oral tablet, 20 mg= 1 tab(s), Oral, Daily, PRN, 2 refills,? ?Not taking  ??Flomax 0.4 mg oral capsule, 0.4 mg= 1 cap(s), Oral, Daily, 3 refills  ??Flonase 50 mcg/inh nasal spray, 1 spray(s), Nasal, BID  ??gabapentin 400 mg oral capsule, 400 mg= 1 cap(s), Oral, At Bedtime, 2 refills  ??losartan 100 mg oral tablet, 100 mg= 1 tab(s), Oral, Daily, 2 refills  ??Nitrostat 0.4 mg sublingual tablet, 0.4 mg= 1 tab(s), SL, q5min, PRN, 3 refills  ??polyethylene glycol 3350 ( MiraLax ), 17 gm, Oral, Daily, PRN  ??Pravachol 80 mg oral tablet, 80 mg= 1 tab(s), Oral, Daily, 2 refills  ??PREDNISONE TAB 50MG  ??RANITIDINE TAB 300MG  ??Silvadene 1% topical cream, 1 nando, TOP, BID, 2 refills  ??Super B Complex oral tablet, 1 tab(s), Oral, Daily  ??tamsulosin 0.4 mg oral capsule, 0.4 mg= 1 cap(s), Oral, Daily,? ?Not taking  ??Vitamin D3 1000 intl units oral tablet, 1 TAB, Oral, Daily  ??Zyrtec 10 mg oral tablet, 10 mg= 1 tab(s), Oral, Daily, 3 refills  Allergies  povidone iodine topical?(unknown)  Social History  ??Alcohol  ???Past, Beer, Wine, Liquor, 3-5 times per week, Started age 16 Years. Stopped age 62 Years. Alcohol use interferes with work or home: No. Drinks more than intended: No. Others hurt by drinking: No. Ready to change: No. Household alcohol concerns: No., 04/01/2015  ??Employment/School  ???Retired, Previous employment/school: [employer]. Highest education level: High school., 11/29/2016  ??Exercise - Does not exercise, 04/01/2015  ???Exercise frequency: 1-2 times/week. Exercise type: Walking., 07/17/2017  ??Home/Environment  ???Lives with Children. Living situation: Home/Independent. Alcohol abuse in household: No. Substance abuse in household: No. Smoker in household: No. Injuries/Abuse/Neglect in household: No. Feels unsafe  at home: No. Safe place to go: Yes. Agency(s)/Others notified: No. Family/Friends available for support: Yes. Concern for family members at home: No. Major illness in household: No. Financial concerns: No., 11/29/2016  ??Nutrition/Health  ???Regular, 04/01/2015  ??Sexual  ???Sexually active: Yes., 04/01/2015  ??Substance Abuse - Denies Substance Abuse, 04/01/2015  ???Never, 12/07/2016  ??Tobacco  ???Former smoker Use:., 07/03/2018  Family History  ??Cancer of stomach: Brother.  ??Colon cancer: Brother.  ??Diabetes mellitus type 1.: Sister.  ??Hearing loss associated with syndrome 07-JUN-2017 17:55:49<$>: Sister.  ??Hypertension.: Mother, Father, Sister and Brother.  ??Liver cancer: Brother.  ??Peripheral vascular disease.: Sister.  Immunizations  ?Vaccine ?Date ?Status   ?influenza virus vaccine, inactivated 11/03/2016 Given   ?zoster vaccine live 04/29/2015 Recorded   ?pneumococcal 13-valent conjugate vaccine 03/04/2015 Recorded   ?influenza virus vaccine, inactivated 10/08/2014 Recorded   ?tetanus/diphtheria/pertussis, acel(Tdap) 03/09/2011 Recorded       I agree with resident documentation. I was physically present, supervised resident, ?and discussed plan of care.  proceed with EUA for anal cancer surveillance

## 2022-05-03 NOTE — HISTORICAL [facility] CERNER
This is a historical note converted from Mary. Formatting and pictures may have been removed.  Please reference Mary for original formatting and attached multimedia. Chief Complaint  HX: ANAL CA, RESULTS OF ANOSCOPY, C/O DIARRHEA  History of Present Illness  history of anal squamous cell CA  s/p lucinda protocol  has no complaints, no continence issues  Review of Systems  10 point review of systems reviewed and otherwise negative  Physical Exam  nad aox3  regular rate by radial pulse  no increased work of breathing, normal respiratory effort on exam  abd soft NT ND  ext 2+ radial and DP pulses bilaterally  Assessment/Plan  EUA january 3 for surveillance exam  imaging screening per oncology  will RTC after EUA  ?   above information per Dr. Desean Zapata  ?   No changes in patients medical history since last seen in clinic.  Okay to proceed to OR for EUA.  Patient to follow up in clinic tomorrow 1/4  ?   Jerry Ling MD PGY-1  General Surgery   Problem List/Past Medical History  Ongoing  Atherosclerosis  BPH without urinary obstruction  Cataract of right eye  Cervical stenosis of spinal canal  Constipation  Gout  Gout  HTN (hypertension)  Hyperlipemia  Hyperlipidaemia  Joint pain  Male impotence  Mononeuritis  Obesity  Peripheral neuropathy  PVD (peripheral vascular disease)  Salzmann nodular dystrophy  Squamous cell cancer, anus  Historical  Able to lie down  Allergic rhinitis, seasonal  CAD (coronary atherosclerotic disease)  Chronic kidney disease (CKD)  Claudication in peripheral vascular disease  Colon cancer  Constipation  Diverticulosis of colon without diverticulitis  Gout  H/O gastritis  History of acoustic neuroma  History of colon polyps  HLD (hyperlipidemia)  HTN (hypertension)  PAD (peripheral artery disease)  Peripheral neuropathy  Prostate enlargement  PVD (peripheral vascular disease)  SOB (shortness of breath) on exertion  Tinea cruris  Procedure/Surgical History  36122 - INJ FORAMEN EPIDURAL  LUM / SAC 1 LEVEL (None) (10/12/2017)  24947 - INJ FORAMEN EPIDURAL LUM / SAC EA ADDL LEVEL (None) (10/12/2017)  Injection(s), anesthetic agent and/or steroid, transforaminal epidural, with imaging guidance (fluoroscopy or CT); lumbar or sacral, each additional level (List separately in addition to code for primary procedure) (10/12/2017)  Injection(s), anesthetic agent and/or steroid, transforaminal epidural, with imaging guidance (fluoroscopy or CT); lumbar or sacral, single level (10/12/2017)  Introduction of Anesthetic Agent into Spinal Canal, Percutaneous Approach (10/12/2017)  Introduction of Anti-inflammatory into Spinal Canal, Percutaneous Approach (10/12/2017)  09616-EAU DX/THER SBST INTRLMNR LMBR/SAC W/IMG GDN (None) (08/03/2017)  Fluoroscopy of Spinal Cord using Low Osmolar Contrast (08/03/2017)  Injection(s), of diagnostic or therapeutic substance(s) (eg, anesthetic, antispasmodic, opioid, steroid, other solution), not including neurolytic substances, including needle or catheter placement, interlaminar epidural or subarac 19-DEC-2017 11:22:23<$> 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/2017)  Excision of lesion, cornea (keratectomy, lamellar, partial), except pterygium (05/05/2017)  Excision of Right Cornea, External Approach (05/05/2017)  Anoscopy; diagnostic, including collection of specimen(s) by brushing or washing, when performed (separate procedure) (04/10/2017)  Exam Under Anesthesia Rectal (None) (04/10/2017)  Anorectal exam, surgical, requiring anesthesia (general, spinal, or epidural), diagnostic (12/14/2016)  Exam Under Anesthesia Rectal (None) (12/14/2016)  Inspection of Gastrointestinal Tract, Via Natural or Artificial Opening Endoscopic Approach (12/14/2016)  Biopsy Gastrointestional (11/18/2016)  Colonoscopy (11/18/2016)  Colonoscopy, flexible; with biopsy, single or multiple  (11/18/2016)  Excision of Rectum, Via Natural or Artificial Opening Endoscopic, Diagnostic (11/18/2016)  PTCA - Percutaneous transluminal coronary angioplasty (05/02/2011)  Hernia Repair (2008)  chest tube in right lung twice due to pneumothorax (01/01/1993)  arthroscopic surgery of right knee (01/01/1991)  right ear TM surgery (01/01/1975)  frontal skull fracture with steel plate (01/01/1971)  left femoral popliteal bypass (01/01/1971)  ventral hernia surgery (01/01/1971)  R LE Stent  Medications  Inpatient  heparin 5000 units/mL injectable solution, 5000 units= 1 mL, Subcutaneous, On Call  mitoMYcin, 0.4 mg= 1 mL, Intraocular, Once  Tylenol, 650 mg= 2 tab(s), Oral, Once  Home  allopurinol 100 mg oral tablet, 100 mg= 1 tab(s), Oral, Daily, 3 refills  Anusol HC rectal ointment, See Instructions, 2 refills  aspercreme with lidocaine cream, See Instructions  aspirin 81 mg oral tablet, CHEWABLE, 81 mg= 1 tab(s), Oral, Daily  Cialis 20 mg oral tablet, 20 mg= 1 tab(s), Oral, Daily, PRN, 2 refills  Detrol LA 2 mg oral capsule, extended release, 2 mg= 1 cap(s), Oral, Daily, 1 refills  FINASTERIDE TAB 5MG, 5 mg= 1 tab(s), Oral, Daily  Flomax 0.4 mg oral capsule, 0.4 mg= 1 cap(s), Oral, Daily, 1 refills  gabapentin 400 mg oral capsule, 400 mg= 1 cap(s), Oral, At Bedtime, 2 refills  losartan 100 mg oral tablet, 100 mg= 1 tab(s), Oral, Daily, 2 refills  Nitrostat 0.4 mg sublingual tablet, 0.4 mg= 1 tab(s), SL, q5min, PRN, 3 refills  polyethylene glycol 3350 ( MiraLax ), 17 gm, Oral, Daily, PRN  Pravachol 80 mg oral tablet, 80 mg= 1 tab(s), Oral, Daily, 2 refills  Silvadene 1% topical cream, 1 nando, TOP, BID, 2 refills  Super B Complex oral tablet, 1 tab(s), Oral, Daily  Vitamin D3 1000 intl units oral tablet, 1 TAB, Oral, Daily  Allergies  povidone iodine topical?(unknown)  Social History  Alcohol - 07/31/2017  Past, Beer, Wine, Liquor, 3-5 times per week, Started age 16 Years. Stopped age 62 Years. Alcohol use interferes with  work or home: No. Drinks more than intended: No. Others hurt by drinking: No. Ready to change: No. Household alcohol concerns: No.  Employment/School - 07/31/2017  Retired, Previous employment/school: [employer]. Highest education level: High school.  Exercise - Does not exercise, 07/31/2017  Exercise frequency: 1-2 times/week. Exercise type: Walking.  Home/Environment - 07/31/2017  Lives with Children. Living situation: Home/Independent. Alcohol abuse in household: No. Substance abuse in household: No. Smoker in household: No. Injuries/Abuse/Neglect in household: No. Feels unsafe at home: No. Safe place to go: Yes. Agency(s)/Others notified: No. Family/Friends available for support: Yes. Concern for family members at home: No. Major illness in household: No. Financial concerns: No.  Nutrition/Health - 07/31/2017  Regular  Sexual - 07/31/2017  Sexually active: Yes.  Substance Abuse - Denies Substance Abuse, 07/31/2017  Never  Tobacco - 07/31/2017  Former smoker, Cigarettes, 60 per day. Started age 16.0 Years. Stopped age 52 Years. Ready to change: No.  Family History  Cancer of stomach: Brother.  Colon cancer: Brother.  Diabetes mellitus type 1.: Sister.  Hearing loss associated with syndrome 07-JUN-2017 17:55:49<$>: Sister.  Hypertension.: Mother, Father, Sister and Brother.  Liver cancer: Brother.  Peripheral vascular disease.: Sister.

## 2022-05-03 NOTE — HISTORICAL [facility] CERNER
This is a historical note converted from [employer]. Formatting and pictures may have been removed.  Please reference [employer] for original formatting and attached multimedia. Indication for Surgery  79M with hx of anal squamous cell carcinoma. Yearly surveillance EUAs  Preoperative Diagnosis  hx of anal cancer  Postoperative Diagnosis  same  Operation  exam under anesthesia  Surgeon(s)  Dr. Beltrán - Attending  Dr. Rain - PGY1  Anesthesia  General  Estimated Blood Loss  none  Findings  no mass or lesions appreciated  Specimen(s)  none  Complications  none  Technique  After informed consent was obtained, patient was taken to the OR and placed supine or the operating table. General anesthesia was induced and patient was positioned lithotomy. The area was prepped and draped and sterile fashion. On rectal exam there was good tone, no masses were appreciated, and there was not blood. Small Hill-Cox retractor was used to dilate the anus and then and medium retractor was inserted. The anal canal was visualized circumferentially. No suspicious tissue changes or masses were appreciated. No biopsies were taken. The exam was concluded and the patient was extubated and taken to PACU in stable condition.  ?  Dr. Beltrán was scrubbed for the entire procedure  ?

## 2022-05-03 NOTE — HISTORICAL [facility]ER
This is a historical note converted from [employer]. Formatting and pictures may have been removed.  Please reference Mary for original formatting and attached multimedia. History of Present Illness  Mr. 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 ?He underwent treatment with external beam radiation?and has had periodic exam under anesthesia by?general surgery without any recurrence.  ?  He reports?problems with constipation. ?He states some days he will have a bowel movement every day. ?Other times he may go 4 days without a bowel movement. ?He was?doing MiraLAX but this was not helping. ?He noticed a change in constipation when his blood pressure medicine was recently?started. ?He is now taking lactulose twice a day but without significant improvement in his constipation. ?He denies any rectal bleeding or melena although he does notice?small amount of bright red blood on the tissue intermittently when he wipes.? He denies any abdominal pain. ?His weight is been stable.  ?  His last colonoscopy was in November 2016 when the anal cancer was identified. ?He reports a history of colon polyps. ?He did not have any colon polyps at the time.? His last exam under anesthesia was in June 2019 without any recurrence.  ?  He denies any family history of colon polyps or colon cancer.  Review of Systems  Comprehensive Review of Systems performed with no exceptions other than as noted in HPI.  ?  Physical Exam  Vitals & Measurements  T:?36.3? ?C (Oral)? HR:?66(Monitored)? RR:?16? BP:?153/76? SpO2:?100%? WT:?82.1?kg? BMI:?26.73?  General:?well-developed well-nourished in no acute distress  Eye: PERRLA, EOMI, clear conjunctiva  HENT:? oropharynx without erythema/exudate, oropharynx and nasal mucosal surfaces moist  Neck:? no thyromegaly or lymphadenopathy, trachea midline  Respiratory:?symmetrical chest expansion and respiratory effort, clear to  auscultation bilaterally  Cardiovascular:?regular rate and rhythm without murmurs, gallops or rubs  Gastrointestinal:?soft, non-tender, non-distended with normal bowel sounds, without masses to palpation  Integumentary: no rashes or skin lesions present  Neurologic: cranial nerves intact, no asterixis, awake, alert, and oriented  Psych: good insight, appropriate mood, normal affect  ?  Assessment/Plan  Mr. 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 ?He underwent treatment with external beam radiation?and has had periodic exam under anesthesia by?general surgery without any recurrence.  ?  Risks, benefits, and alternatives of the procedure discussed.?  Will proceed with endoscopic procedure as scheduled.   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  Squamous cell carcinoma of anal canal  Historical  Allergic rhinitis, seasonal  CAD (coronary atherosclerotic disease)  Chronic kidney disease (CKD)  Claudication in peripheral vascular disease  Constipation  Diverticulosis of colon without diverticulitis  Gout  H/O gastritis  History of acoustic neuroma  History of colon polyps  HLD (hyperlipidemia)  HTN (hypertension)  PAD (peripheral artery disease)  Peripheral neuropathy  Prostate enlargement  PVD (peripheral vascular disease)  SOB (shortness of breath) on exertion  Tinea cruris  Procedure/Surgical History  Anoscopy; diagnostic, including collection of specimen(s) by brushing or washing, when performed (separate procedure) (06/12/2019)  Exam Under Anesthesia Rectal (None) (06/12/2019)  Inspection of Lower Intestinal Tract, Via Natural or Artificial Opening Endoscopic (06/12/2019)  Cataract Extraction Phacoemulsification (Right) (01/31/2019)  Extracapsular cataract removal with insertion of intraocular lens prosthesis (1 stage procedure), manual or mechanical technique (eg,  irrigation and aspiration or phacoemulsification) (01/31/2019)  Replacement of Right Lens with Synthetic Substitute, Percutaneous Approach (01/31/2019)  Anoscopy; diagnostic, including collection of specimen(s) by brushing or washing, when performed (separate procedure) (07/25/2018)  Exam Under Anesthesia Rectal (None) (07/25/2018)  Inspection of Lower Intestinal Tract, Via Natural or Artificial Opening Endoscopic (07/25/2018)  Catheter placement in coronary artery(s) for coronary angiography, including intraprocedural injection(s) for coronary angiography, imaging supervision and interpretation; with left heart catheterization including intraprocedural injection(s) for left catrachito (03/26/2018)  Fluoroscopy of Left Heart using Low Osmolar Contrast (03/26/2018)  Fluoroscopy of Left Lower Extremity Arteries using Low Osmolar Contrast (03/26/2018)  Fluoroscopy of Multiple Coronary Arteries using Low Osmolar Contrast (03/26/2018)  Fluoroscopy of Right Lower Extremity Arteries using Low Osmolar Contrast (03/26/2018)  Measurement of Cardiac Sampling and Pressure, Left Heart, Percutaneous Approach (03/26/2018)  Anoscopy; diagnostic, including collection of specimen(s) by brushing or washing, when performed (separate procedure) (01/03/2018)  Exam Under Anesthesia Rectal (None) (01/03/2018)  Inspection of Lower Intestinal Tract, Via Natural or Artificial Opening Endoscopic (01/03/2018)  00662 - INJ FORAMEN EPIDURAL LUM / SAC 1 LEVEL (None) (10/12/2017)  43190 - INJ FORAMEN EPIDURAL LUM / SAC EA ADDL LEVEL (None) (10/12/2017)  Injection(s), anesthetic agent and/or steroid, transforaminal epidural, with imaging guidance (fluoroscopy or CT); lumbar or sacral, each additional level (List separately in addition to code for primary procedure) (10/12/2017)  Injection(s), anesthetic agent and/or steroid, transforaminal epidural, with imaging guidance (fluoroscopy or CT); lumbar or sacral, single level (10/12/2017)  Introduction of  Anesthetic Agent into Spinal Canal, Percutaneous Approach (10/12/2017)  Introduction of Anti-inflammatory into Spinal Canal, Percutaneous Approach (10/12/2017)  39969-LHJ DX/THER SBST INTRLMNR LMBR/SAC W/IMG GDN (None) (08/03/2017)  Fluoroscopy of Spinal Cord using Low Osmolar Contrast (08/03/2017)  Injection(s), of diagnostic or therapeutic substance(s) (eg, anesthetic, antispasmodic, opioid, steroid, other solution), not including neurolytic substances, including needle or catheter placement, interlaminar epidural or subarac. 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/2017)  Excision of lesion, cornea (keratectomy, lamellar, partial), except pterygium (05/05/2017)  Excision of Right Cornea, External Approach (05/05/2017)  Anoscopy; diagnostic, including collection of specimen(s) by brushing or washing, when performed (separate procedure) (04/10/2017)  Exam Under Anesthesia Rectal (None) (04/10/2017)  Anorectal exam, surgical, requiring anesthesia (general, spinal, or epidural), diagnostic (12/14/2016)  Exam Under Anesthesia Rectal (None) (12/14/2016)  Inspection of Gastrointestinal Tract, Via Natural or Artificial Opening Endoscopic Approach (12/14/2016)  Biopsy Gastrointestional (11/18/2016)  Colonoscopy (11/18/2016)  Colonoscopy, flexible; with biopsy, single or multiple (11/18/2016)  Excision of Rectum, Via Natural or Artificial Opening Endoscopic, Diagnostic (11/18/2016)  PTCA - Percutaneous transluminal coronary angioplasty (05/02/2011)  Hernia Repair (2008)  chest tube in right lung twice due to pneumothorax (01/01/1993)  arthroscopic surgery of right knee (01/01/1991)  right ear TM surgery (01/01/1975)  frontal skull fracture with steel plate (01/01/1971)  left femoral popliteal bypass (01/01/1971)  ventral hernia surgery (01/01/1971)  R LE Stent   Medications  Inpatient  buffered lidocaine 2% - 0.5 ml syringe, 10  mg= 0.5 mL, Subcutaneous, As Directed  IVF Lactated Ringers LR Infusion 1,000 mL, 1000 mL, IV  Home  Acular 0.5% ophthalmic solution, 1 drop(s), Eye-Both, QID  allopurinol 100 mg oral tablet, 100 mg= 1 tab(s), Oral, Daily, 3 refills  Artificial Tears, Eye-Both  aspercreme with lidocaine cream, See Instructions,? ?Not taking  aspirin 81 mg oral tablet, CHEWABLE, 81 mg= 1 tab(s), Oral, Daily  docusate sodium 50 mg oral capsule, 50 mg= 1 cap(s), Oral, BID, PRN  gabapentin 400 mg oral capsule, See Instructions, 1 refills,? ?Still taking, not as prescribed: takes one in am, one in pm  lactulose 10 g/15 mL oral syrup, 10 gm= 15 mL, Oral, Daily, 3 refills  losartan 100 mg oral tablet, See Instructions, 1 refills  Norvasc 5 mg oral tablet, 5 mg= 1 tab(s), Oral, Daily, 5 refills  pravastatin 40 mg oral tablet, 40 mg= 1 tab(s), Oral, Daily, 11 refills  Pred Forte 1% ophthalmic suspension, 1 drop(s), Eye-Both, QID,? ?Not Taking, Completed Rx  Super B Complex oral tablet, 1 tab(s), Oral, Daily  tamsulosin 0.4 mg oral capsule, 0.4 mg= 1 cap(s), Oral, At Bedtime, 3 refills  Zyrtec 10 mg oral tablet, 10 mg= 1 tab(s), Oral, Daily, 2 refills  Allergies  povidone iodine topical?(unknown)  Social History  Abuse/Neglect  No, No, Yes, 02/03/2020  Alcohol  Current, Liquor, 1-2 times per year, 04/18/2019  Employment/School  Retired, Previous employment/school: [employer]. Highest education level: High school., 11/29/2016  Exercise - Does not exercise, 04/01/2015  Exercise frequency: 1-2 times/week. Exercise type: Walking., 07/17/2017  Home/Environment  Lives with Children. Living situation: Home/Independent. Alcohol abuse in household: No. Substance abuse in household: No. Smoker in household: No. Injuries/Abuse/Neglect in household: No. Feels unsafe at home: No. Safe place to go: Yes. Agency(s)/Others notified: No. Family/Friends available for support: Yes. Concern for family members at home: No. Major illness in household: No.  Financial concerns: No., 11/29/2016  Nutrition/Health  Regular, 04/01/2015  Sexual  Gender Identity Identifies as male., 10/23/2019  Substance Use - Denies Substance Abuse, 04/01/2015  Never, 12/07/2016  Tobacco  Never (less than 100 in lifetime), N/A, 02/03/2020  Family History  Acute myocardial infarction.: Sister.  Cancer of stomach: Brother.  Colon cancer: Brother.  Diabetes mellitus type 1.: Sister.  Hearing loss associated with syndrome...: Sister.  Hypertension.: Mother, Father, Sister and Brother.  Liver cancer: Brother.  Peripheral vascular disease.: Sister.  Immunizations  Vaccine Date Status   influenza virus vaccine, inactivated 11/22/2019 Given   influenza virus vaccine, inactivated 11/03/2016 Given   zoster vaccine live 04/29/2015 Recorded   pneumococcal 13-valent conjugate vaccine 03/04/2015 Recorded   influenza virus vaccine, inactivated 10/08/2014 Recorded   tetanus/diphtheria/pertussis, acel(Tdap) 03/09/2011 Recorded

## 2025-07-26 ENCOUNTER — HOSPITAL ENCOUNTER (EMERGENCY)
Facility: HOSPITAL | Age: 85
Discharge: HOME | End: 2025-07-26
Attending: EMERGENCY MEDICINE
Payer: MEDICARE

## 2025-07-26 VITALS
WEIGHT: 170 LBS | SYSTOLIC BLOOD PRESSURE: 207 MMHG | RESPIRATION RATE: 18 BRPM | DIASTOLIC BLOOD PRESSURE: 87 MMHG | HEART RATE: 45 BPM | TEMPERATURE: 97 F | OXYGEN SATURATION: 100 % | BODY MASS INDEX: 25.18 KG/M2 | HEIGHT: 69 IN

## 2025-07-26 DIAGNOSIS — M25.569 KNEE PAIN: ICD-10-CM

## 2025-07-26 DIAGNOSIS — M17.11 PRIMARY OSTEOARTHRITIS OF RIGHT KNEE: ICD-10-CM

## 2025-07-26 DIAGNOSIS — J02.9 VIRAL PHARYNGITIS: Primary | ICD-10-CM

## 2025-07-26 DIAGNOSIS — J06.9 VIRAL UPPER RESPIRATORY TRACT INFECTION WITH COUGH: ICD-10-CM

## 2025-07-26 LAB
ALBUMIN SERPL-MCNC: 3.4 G/DL (ref 3.4–4.8)
ALBUMIN/GLOB SERPL: 0.9 RATIO (ref 1.1–2)
ALP SERPL-CCNC: 80 UNIT/L (ref 40–150)
ALT SERPL-CCNC: 7 UNIT/L (ref 0–55)
ANION GAP SERPL CALC-SCNC: 10 MEQ/L
AST SERPL-CCNC: 13 UNIT/L (ref 11–45)
BASOPHILS # BLD AUTO: 0.03 X10(3)/MCL
BASOPHILS NFR BLD AUTO: 0.6 %
BILIRUB SERPL-MCNC: 1 MG/DL
BUN SERPL-MCNC: 22.4 MG/DL (ref 8.4–25.7)
CALCIUM SERPL-MCNC: 8.8 MG/DL (ref 8.8–10)
CHLORIDE SERPL-SCNC: 115 MMOL/L (ref 98–107)
CO2 SERPL-SCNC: 20 MMOL/L (ref 23–31)
CREAT SERPL-MCNC: 0.8 MG/DL (ref 0.72–1.25)
CREAT/UREA NIT SERPL: 28
EOSINOPHIL # BLD AUTO: 0.1 X10(3)/MCL (ref 0–0.9)
EOSINOPHIL NFR BLD AUTO: 2.1 %
ERYTHROCYTE [DISTWIDTH] IN BLOOD BY AUTOMATED COUNT: 14.1 % (ref 11.5–17)
FLUAV AG UPPER RESP QL IA.RAPID: NOT DETECTED
FLUBV AG UPPER RESP QL IA.RAPID: NOT DETECTED
GFR SERPLBLD CREATININE-BSD FMLA CKD-EPI: >60 ML/MIN/1.73/M2
GLOBULIN SER-MCNC: 3.8 GM/DL (ref 2.4–3.5)
GLUCOSE SERPL-MCNC: 93 MG/DL (ref 82–115)
HCT VFR BLD AUTO: 36.4 % (ref 42–52)
HGB BLD-MCNC: 11.4 G/DL (ref 14–18)
IMM GRANULOCYTES # BLD AUTO: 0.01 X10(3)/MCL (ref 0–0.04)
IMM GRANULOCYTES NFR BLD AUTO: 0.2 %
LYMPHOCYTES # BLD AUTO: 0.95 X10(3)/MCL (ref 0.6–4.6)
LYMPHOCYTES NFR BLD AUTO: 20.4 %
MCH RBC QN AUTO: 27.1 PG (ref 27–31)
MCHC RBC AUTO-ENTMCNC: 31.3 G/DL (ref 33–36)
MCV RBC AUTO: 86.5 FL (ref 80–94)
MONOCYTES # BLD AUTO: 0.4 X10(3)/MCL (ref 0.1–1.3)
MONOCYTES NFR BLD AUTO: 8.6 %
NEUTROPHILS # BLD AUTO: 3.17 X10(3)/MCL (ref 2.1–9.2)
NEUTROPHILS NFR BLD AUTO: 68.1 %
NRBC BLD AUTO-RTO: 0 %
PLATELET # BLD AUTO: 168 X10(3)/MCL (ref 130–400)
PMV BLD AUTO: 10.6 FL (ref 7.4–10.4)
POTASSIUM SERPL-SCNC: 3.4 MMOL/L (ref 3.5–5.1)
PROT SERPL-MCNC: 7.2 GM/DL (ref 5.8–7.6)
RBC # BLD AUTO: 4.21 X10(6)/MCL (ref 4.7–6.1)
RSV A 5' UTR RNA NPH QL NAA+PROBE: NOT DETECTED
SARS-COV-2 RNA RESP QL NAA+PROBE: NOT DETECTED
SODIUM SERPL-SCNC: 145 MMOL/L (ref 136–145)
STREP A PCR (OHS): NOT DETECTED
WBC # BLD AUTO: 4.66 X10(3)/MCL (ref 4.5–11.5)

## 2025-07-26 PROCEDURE — 85025 COMPLETE CBC W/AUTO DIFF WBC: CPT | Performed by: PHYSICIAN ASSISTANT

## 2025-07-26 PROCEDURE — 80053 COMPREHEN METABOLIC PANEL: CPT | Performed by: PHYSICIAN ASSISTANT

## 2025-07-26 PROCEDURE — 87651 STREP A DNA AMP PROBE: CPT | Performed by: PHYSICIAN ASSISTANT

## 2025-07-26 PROCEDURE — 87637 SARSCOV2&INF A&B&RSV AMP PRB: CPT | Performed by: PHYSICIAN ASSISTANT

## 2025-07-26 PROCEDURE — 99284 EMERGENCY DEPT VISIT MOD MDM: CPT | Mod: 25

## 2025-07-26 RX ORDER — MELOXICAM 7.5 MG/1
7.5 TABLET ORAL DAILY
Qty: 30 TABLET | Refills: 0 | Status: SHIPPED | OUTPATIENT
Start: 2025-07-26

## 2025-07-26 RX ORDER — BENZONATATE 200 MG/1
200 CAPSULE ORAL 3 TIMES DAILY PRN
Qty: 30 CAPSULE | Refills: 0 | Status: SHIPPED | OUTPATIENT
Start: 2025-07-26 | End: 2025-08-05

## 2025-07-26 NOTE — FIRST PROVIDER EVALUATION
Medical screening examination initiated.  I have conducted a focused provider triage encounter, findings are as follows:    Brief history of present illness:  85yoAAM presents to the emergency department by private vehicle with sore throat and trouble swallowing that began yesterday.  Patient having cough.    Daughter at the bedside.    There were no vitals filed for this visit.    Pertinent physical exam:  No acute.    Brief workup plan:  Swabs    Preliminary workup initiated; this workup will be continued and followed by the physician or advanced practice provider that is assigned to the patient when roomed.

## 2025-07-26 NOTE — DISCHARGE INSTRUCTIONS
Take tylenol every 6 hours for throat pain (do not take advil while taking meloxicam)    Salt water gargles, throat lozenges, and humidifier at night may help throat pain    Take tessalon pearls 3 times a day as needed for cough.    Take meloxicam once daily for knee pain.

## 2025-07-26 NOTE — ED PROVIDER NOTES
Encounter Date: 7/26/2025       History     Chief Complaint   Patient presents with    Dysphagia     C/o difficulty swallowing onset yesterday. Denies SOB/CP. Also reports cough onset this morning. Denies fever.        Sore Throat   This is a new problem. The sore throat symptoms include difficulty swallowing.The current episode started two days ago. The problem has been unchanged. There has been no fever. The pain is at a severity of 5/10. Associated symptoms include coughing, a hoarse voice and trouble swallowing. Pertinent negatives include no abdominal pain, congestion, diarrhea, ear pain, headaches, neck pain, shortness of breath, stridor or vomiting. He has tried nothing for the symptoms.   Cough  This is a new problem. The current episode started two days ago. The problem occurs constantly. The problem has been unchanged. The cough is Productive of sputum. There has been no fever. Associated symptoms include sore throat. Pertinent negatives include no chest pain, no chills, no ear pain, no headaches, no rhinorrhea, no myalgias, no shortness of breath and no wheezing. He has tried nothing for the symptoms. He is not a smoker. His past medical history does not include COPD, emphysema or asthma.     Review of patient's allergies indicates:   Allergen Reactions    Povidone-iodine Other (See Comments)     No past medical history on file.  No past surgical history on file.  No family history on file.  Social History[1]  Review of Systems   Constitutional:  Negative for chills, fatigue and fever.   HENT:  Positive for hoarse voice, sore throat and trouble swallowing. Negative for congestion, ear pain, rhinorrhea and sinus pain.    Respiratory:  Positive for cough. Negative for chest tightness, shortness of breath, wheezing and stridor.    Cardiovascular:  Negative for chest pain and palpitations.   Gastrointestinal:  Negative for abdominal pain, constipation, diarrhea, nausea and vomiting.   Genitourinary:  Negative  for dysuria and frequency.   Musculoskeletal:  Negative for arthralgias, myalgias and neck pain.   Neurological:  Negative for dizziness, syncope, weakness and headaches.   Psychiatric/Behavioral:  Negative for confusion.    All other systems reviewed and are negative.      Physical Exam     Initial Vitals [07/26/25 0940]   BP Pulse Resp Temp SpO2   127/81 61 18 97.3 °F (36.3 °C) 100 %      MAP       --         Physical Exam    Vitals reviewed.  Constitutional: He appears well-developed and well-nourished. No distress.   HENT:   Head: Normocephalic and atraumatic. Mouth/Throat: Uvula is midline and mucous membranes are normal. Posterior oropharyngeal erythema present. No oropharyngeal exudate, posterior oropharyngeal edema or tonsillar abscesses.   Eyes: EOM are normal. Pupils are equal, round, and reactive to light.   Cardiovascular:  Normal rate, regular rhythm, normal heart sounds and intact distal pulses.           Pulmonary/Chest: Breath sounds normal.   Abdominal: Abdomen is soft. Bowel sounds are normal. There is no abdominal tenderness.   Musculoskeletal:         General: Normal range of motion.     Neurological: He is alert and oriented to person, place, and time.   Skin: Skin is warm and dry.         ED Course   Procedures  Labs Reviewed   COMPREHENSIVE METABOLIC PANEL - Abnormal       Result Value    Sodium 145      Potassium 3.4 (*)     Chloride 115 (*)     CO2 20 (*)     Glucose 93      Blood Urea Nitrogen 22.4      Creatinine 0.80      Calcium 8.8      Protein Total 7.2      Albumin 3.4      Globulin 3.8 (*)     Albumin/Globulin Ratio 0.9 (*)     Bilirubin Total 1.0      ALP 80      ALT 7      AST 13      eGFR >60      Anion Gap 10.0      BUN/Creatinine Ratio 28     CBC WITH DIFFERENTIAL - Abnormal    WBC 4.66      RBC 4.21 (*)     Hgb 11.4 (*)     Hct 36.4 (*)     MCV 86.5      MCH 27.1      MCHC 31.3 (*)     RDW 14.1      Platelet 168      MPV 10.6 (*)     Neut % 68.1      Lymph % 20.4      Mono %  8.6      Eos % 2.1      Basophil % 0.6      Imm Grans % 0.2      Neut # 3.17      Lymph # 0.95      Mono # 0.40      Eos # 0.10      Baso # 0.03      Imm Gran # 0.01      NRBC% 0.0     COVID/RSV/FLU A&B PCR - Normal    Influenza A PCR Not Detected      Influenza B PCR Not Detected      Respiratory Syncytial Virus PCR Not Detected      SARS-CoV-2 PCR Not Detected      Narrative:     The Xpert Xpress SARS-CoV-2/FLU/RSV plus is a rapid, multiplexed real-time PCR test intended for the simultaneous qualitative detection and differentiation of SARS-CoV-2, Influenza A, Influenza B, and respiratory syncytial virus (RSV) viral RNA in either nasopharyngeal swab or nasal swab specimens.         STREP GROUP A BY PCR - Normal    STREP A PCR ([facility]) Not Detected      Narrative:     The Xpert Xpress Strep A test is a rapid, qualitative in vitro diagnostic test for the detection of Streptococcus pyogenes (Group A ß-hemolytic Streptococcus, Strep A) in throat swab specimens from patients with signs and symptoms of pharyngitis.     CBC W/ AUTO DIFFERENTIAL    Narrative:     The following orders were created for panel order CBC auto differential.  Procedure                               Abnormality         Status                     ---------                               -----------         ------                     CBC with Differential[[id number]]       Abnormal            Final result                 Please view results for these tests on the individual orders.          Imaging Results              X-Ray Knee 3 View Right (Final result)  Result time 07/26/25 12:23:20      Final result by Ritu Miner MD (07/26/25 12:23:20)                   Impression:      Severe tricompartmental osteoarthritis at the knee.      Electronically signed by: Ritu Miner  Date:    07/26/2025  Time:    12:23               Narrative:    EXAMINATION:  XR KNEE 3 VIEW RIGHT    CLINICAL HISTORY:  Pain in unspecified knee    TECHNIQUE:  AP,  "lateral, and Merchant views of the right knee were performed.    COMPARISON:  None    FINDINGS:  Severe arthritis at the knee.  Bone on bone articulation.  Marginal osteophytes.  Chondrocalcinosis.  Loose bodies.  No appreciable fracture.    Atherosclerosis.                                       X-Ray Chest AP Portable (Final result)  Result time 07/26/25 09:58:11      Final result by Ritu Miner MD (07/26/25 09:58:11)                   Impression:      No acute cardiopulmonary abnormality.      Electronically signed by: Ritu Miner  Date:    07/26/2025  Time:    09:58               Narrative:    EXAMINATION:  XR CHEST AP PORTABLE    CLINICAL HISTORY:  cough;    TECHNIQUE:  Single frontal view of the chest was performed.    COMPARISON:  11/13/2019    FINDINGS:  LINES AND TUBES: None    MEDIASTINUM AND YOVANNY: The cardiac silhouette is normal.    LUNGS: No lobar consolidation. No edema.    PLEURA:No pleural effusion. No pneumothorax.    BONES: No acute osseous abnormality.                                       Medications - No data to display  Medical Decision Making  85 year old male with pmhx of anal cancer and gout presents to ED due to sore throat and productive cough x 2 days. He states the throat pain makes it difficult for him to swallow. He denies fever, shortness of breath, chest pain, abdominal pain, nausea, or vomiting.     Throat appears red with no tonsillar swelling or exudates. Labs are unremarkable, No white count. Chest XR shows no cardiopulmonary process.    While in room, patient mentioned that he also has been having right knee pain for "a while" No calf redness or tenderness. Unsure if he has been dx with arthritis. Will get knee XR    Knee XR shows severe osteoarthritis. Pt states he used to take meloxicam for his arthritis but does not anymore. Will prescribe meloxicam and refer to ortho for possible injections/treatment.     Patient has no fever, white count, or shortness of " breath with a normal XR so I will discharge him with cough medication and symptomatic care instructions for URI and gave return precautions.    Discussed follow up and ED return precautions. Pt verbalized understanding. All questions answered      Amount and/or Complexity of Data Reviewed  Radiology: ordered.    Risk  Prescription drug management.                                          Clinical Impression:  Final diagnoses:  [M25.569] Knee pain  [J02.9] Viral pharyngitis (Primary)  [J06.9] Viral upper respiratory tract infection with cough  [M17.11] Primary osteoarthritis of right knee          ED Disposition Condition    Discharge Stable          ED Prescriptions       Medication Sig Dispense Start Date End Date Auth. Provider    meloxicam (MOBIC) 7.5 MG tablet Take 1 tablet (7.5 mg total) by mouth once daily. 30 tablet 7/26/2025 -- Katerin Elliott PA-C    benzonatate (TESSALON) 200 MG capsule Take 1 capsule (200 mg total) by mouth 3 (three) times daily as needed for Cough. 30 capsule 7/26/2025 8/5/2025 Katerin Elilott PA-C          Follow-up Information       Follow up With Specialties Details Why Contact Info    Primary Care Provider  Schedule an appointment as soon as possible for a visit  Call [phone] to be set up with a primary care provider if you do not have one     [facility] - Emergency Dept Emergency Medicine Go to  If symptoms worsen [facility] [facility] [phone]                   [1]         Katerin Elliott PA-C  07/26/25 [id number]